# Patient Record
Sex: FEMALE | Race: WHITE | NOT HISPANIC OR LATINO | Employment: OTHER | URBAN - METROPOLITAN AREA
[De-identification: names, ages, dates, MRNs, and addresses within clinical notes are randomized per-mention and may not be internally consistent; named-entity substitution may affect disease eponyms.]

---

## 2017-01-01 PROBLEM — R62.7 ADULT FAILURE TO THRIVE SYNDROME: Status: ACTIVE | Noted: 2017-01-01

## 2017-01-01 PROBLEM — R63.4 WEIGHT LOSS: Status: ACTIVE | Noted: 2017-01-01

## 2017-01-01 PROBLEM — S42.309A HUMERUS FRACTURE: Status: ACTIVE | Noted: 2017-01-01

## 2017-01-03 ENCOUNTER — HOSPITAL ENCOUNTER (INPATIENT)
Facility: REHABILITATION | Age: 82
End: 2017-01-03
Attending: PHYSICAL MEDICINE & REHABILITATION | Admitting: PHYSICAL MEDICINE & REHABILITATION
Payer: MEDICARE

## 2017-01-04 PROCEDURE — 99309 SBSQ NF CARE MODERATE MDM 30: CPT | Performed by: PHYSICIAN ASSISTANT

## 2017-01-05 ENCOUNTER — RESOLUTE PROFESSIONAL BILLING HOSPITAL PROF FEE (OUTPATIENT)
Dept: HOSPITALIST | Facility: SKILLED NURSING FACILITY | Age: 82
End: 2017-01-05
Payer: MEDICARE

## 2017-01-05 PROCEDURE — 99306 1ST NF CARE HIGH MDM 50: CPT | Mod: AI | Performed by: FAMILY MEDICINE

## 2017-01-07 PROCEDURE — 99309 SBSQ NF CARE MODERATE MDM 30: CPT | Performed by: PHYSICIAN ASSISTANT

## 2017-01-12 PROCEDURE — 99308 SBSQ NF CARE LOW MDM 20: CPT | Performed by: NURSE PRACTITIONER

## 2017-01-16 PROCEDURE — 99308 SBSQ NF CARE LOW MDM 20: CPT | Performed by: NURSE PRACTITIONER

## 2017-01-20 PROCEDURE — 99309 SBSQ NF CARE MODERATE MDM 30: CPT | Performed by: PHYSICIAN ASSISTANT

## 2017-01-25 PROCEDURE — 99308 SBSQ NF CARE LOW MDM 20: CPT | Performed by: NURSE PRACTITIONER

## 2017-02-01 PROCEDURE — 99309 SBSQ NF CARE MODERATE MDM 30: CPT | Performed by: PHYSICIAN ASSISTANT

## 2017-02-03 PROCEDURE — 99316 NF DSCHRG MGMT 30 MIN+: CPT | Performed by: PHYSICIAN ASSISTANT

## 2017-07-22 ENCOUNTER — OFFICE VISIT (OUTPATIENT)
Dept: URGENT CARE | Facility: PHYSICIAN GROUP | Age: 82
End: 2017-07-22
Payer: MEDICARE

## 2017-07-22 VITALS
TEMPERATURE: 98.2 F | SYSTOLIC BLOOD PRESSURE: 126 MMHG | BODY MASS INDEX: 18.69 KG/M2 | DIASTOLIC BLOOD PRESSURE: 82 MMHG | OXYGEN SATURATION: 97 % | HEIGHT: 61 IN | RESPIRATION RATE: 12 BRPM | HEART RATE: 72 BPM | WEIGHT: 99 LBS

## 2017-07-22 DIAGNOSIS — S01.312A LACERATION OF EAR CANAL, LEFT, INITIAL ENCOUNTER: ICD-10-CM

## 2017-07-22 DIAGNOSIS — H61.23 BILATERAL IMPACTED CERUMEN: ICD-10-CM

## 2017-07-22 PROCEDURE — 99203 OFFICE O/P NEW LOW 30 MIN: CPT | Performed by: NURSE PRACTITIONER

## 2017-07-22 RX ORDER — NEOMYCIN SULFATE, POLYMYXIN B SULFATE, HYDROCORTISONE 3.5; 10000; 1 MG/ML; [USP'U]/ML; MG/ML
4 SOLUTION/ DROPS AURICULAR (OTIC) 3 TIMES DAILY
Qty: 1 BOTTLE | Refills: 0 | Status: SHIPPED | OUTPATIENT
Start: 2017-07-22 | End: 2021-08-12

## 2017-07-22 ASSESSMENT — ENCOUNTER SYMPTOMS
FEVER: 0
SORE THROAT: 0
COUGH: 0
VOMITING: 0
CHILLS: 0
HEADACHES: 0
NAUSEA: 0

## 2017-07-22 ASSESSMENT — PATIENT HEALTH QUESTIONNAIRE - PHQ9: CLINICAL INTERPRETATION OF PHQ2 SCORE: 0

## 2017-07-22 ASSESSMENT — PAIN SCALES - GENERAL: PAINLEVEL: NO PAIN

## 2017-07-22 NOTE — PROGRESS NOTES
"Subjective:      Sherri Balbuena is a 89 y.o. female who presents with Cerumen Impaction            HPI Comments: Medications, Allergies and Prior Medical Hx reviewed and updated in [facility].with patient/family today     Pt states she has decreased hearing in her left ear for > 1 year and in her right ear for a week.     Cerumen Impaction  This is a new problem. The current episode started more than 1 month ago. The problem occurs constantly. The problem has been gradually worsening. Pertinent negatives include no chills, congestion, coughing, fever, headaches, nausea, sore throat or vomiting. Nothing aggravates the symptoms. Treatments tried: ear gtts. The treatment provided no relief.       Review of Systems   Constitutional: Negative for fever and chills.   HENT: Positive for hearing loss. Negative for congestion, ear discharge, ear pain, sore throat and tinnitus.    Respiratory: Negative for cough.    Gastrointestinal: Negative for nausea and vomiting.   Neurological: Negative for headaches.          Objective:     /82 mmHg  Pulse 72  Temp(Src) 36.8 °C (98.2 °F)  Resp 12  Ht 1.549 m (5' 0.98\")  Wt 44.906 kg (99 lb)  BMI 18.72 kg/m2  SpO2 97%     Physical Exam   Constitutional: She appears well-developed and well-nourished. No distress.   HENT:   Head: Normocephalic and atraumatic.   Bilateral cerumen impaction, left canal has a small amt of blood, pt states she has put her finger nail in her left ear several times.    Eyes: Conjunctivae are normal. Pupils are equal, round, and reactive to light.   Neck: Neck supple.   Cardiovascular: Normal rate.    Pulmonary/Chest: Effort normal. No respiratory distress.   Neurological: She is alert.   Awake, alert, answering questions appropriately, moving all extremeties   Skin: Skin is warm and dry. No rash noted.   Psychiatric: She has a normal mood and affect. Her behavior is normal.               Assessment/Plan:     1. Bilateral impacted cerumen  EAR " IRRIGATION (MA ONLY)   2. Laceration of ear canal, left, initial encounter  NEOMYCIN-POLYMYXIN-HC, OTIC, (CORTISPORIN) 1 % Solution       Ears were soaked and then irrigated.   Both tm's are intact and canals are clear,   Left canal has a laceration that is oozing a small amt of bright red blood,     Follow-up with your primary care provider or return here for any problems of concerns  Discharge instructions discussed with pt/family who verbalize understanding and agreement with poc

## 2017-07-22 NOTE — MR AVS SNAPSHOT
"        Sherri Balbuena   2017 10:10 AM   Office Visit   MRN: [id number]    Department:  [facility]   Dept Phone:  [phone]    Description:  Female : 1928   Provider:  ANDRES Pineda           Reason for Visit     Cerumen Impaction x 1 day / bilat ears      Allergies as of 2017     No Known Allergies      You were diagnosed with     Bilateral impacted cerumen   [121912]       Laceration of ear canal, left, initial encounter   [367352]         Vital Signs     Blood Pressure Pulse Temperature Respirations Height Weight    126/82 mmHg 72 36.8 °C (98.2 °F) 12 1.549 m (5' 0.98\") 44.906 kg (99 lb)    Body Mass Index Oxygen Saturation Smoking Status             18.72 kg/m2 97% Never Smoker          Basic Information     Date Of Birth Sex Race Ethnicity Preferred Language    1928 Female White Non- English      Problem List              ICD-10-CM Priority Class Noted - Resolved    Hip fracture (CMS-HCC) S72.009A   2015 - Present    Anemia associated with acute blood loss D62   2015 - Present    Senile debility R54   2016 - Present    Humerus fracture S42.309A   2017 - Present    Weight loss R63.4   2017 - Present    Adult failure to thrive syndrome R62.7   2017 - Present      Health Maintenance        Date Due Completion Dates    PAP SMEAR 1949 ---    MAMMOGRAM 1968 ---    COLONOSCOPY 1978 ---    IMM ZOSTER VACCINE 1988 ---    BONE DENSITY 1993 ---    IMM PNEUMOCOCCAL 65+ (ADULT) LOW/MEDIUM RISK SERIES (2 of 2 - PCV13) 2016    IMM INFLUENZA (1) 2017 ---    IMM DTaP/Tdap/Td Vaccine (2 - Td) 2022            Current Immunizations     Pneumococcal polysaccharide vaccine (PPSV-23) 2015  6:25 PM    Tdap Vaccine 2012      Below and/or attached are the medications your provider expects you to take. Review all of your home medications and newly ordered medications with your provider and/or " pharmacist. Follow medication instructions as directed by your provider and/or pharmacist. Please keep your medication list with you and share with your provider. Update the information when medications are discontinued, doses are changed, or new medications (including over-the-counter products) are added; and carry medication information at all times in the event of emergency situations     Allergies:  No Known Allergies          Medications  Valid as of: July 22, 2017 - 11:32 AM    Generic Name Brand Name Tablet Size Instructions for use    Acetaminophen (Tab) TYLENOL 325 MG Take 2 Tabs by mouth every 6 hours as needed for Fever or Moderate Pain (Temp >101.5F).        Ascorbic Acid (Tab) VITAMIN C 500 MG Take 1 Tab by mouth every day.        Multiple Vitamin (Tab) THERAGRAN  Take 1 Tab by mouth every day.        Neomycin-Polymyxin-HC (Solution) NEOMYCIN-POLYMYXIN-HC (OTIC) 1 % Place 4 Drops in ear 3 times a day.        Oyster Shell (Tab) OS-FOREST 500 500 MG Take 2 Tabs by mouth 2 times a day, with meals.        .                 Medicines prescribed today were sent to:     [facility] #[id number] - [place], [place] - [address] [place] [address] AT [address]    [address] [place] [id number]    Phone: [phone] Fax: [phone]    Open 24 Hours?: No      Medication refill instructions:       If your prescription bottle indicates you have medication refills left, it is not necessary to call your provider’s office. Please contact your pharmacy and they will refill your medication.    If your prescription bottle indicates you do not have any refills left, you may request refills at any time through one of the following ways: The online [employer] system (except Urgent Care), by calling your provider’s office, or by asking your pharmacy to contact your provider’s office with a refill request. Medication refills are processed only during regular business hours and may not be available until the next business day. Your provider  may request additional information or to have a follow-up visit with you prior to refilling your medication.   *Please Note: Medication refills are assigned a new Rx number when refilled electronically. Your pharmacy may indicate that no refills were authorized even though a new prescription for the same medication is available at the pharmacy. Please request the medicine by name with the pharmacy before contacting your provider for a refill.        Your To Do List     Future Labs/Procedures Complete By Expires    EAR IRRIGATION (MA ONLY)  As directed 7/22/2018         [employer] Access Code: [email or web address]  Expires: 8/21/2017 11:32 AM    [employer]  A secure, online tool to manage your health information     [employer]® is a secure, online tool that connects you to your personalized health information from the privacy of your home -- day or night - making it very easy for you to manage your healthcare. Once the activation process is completed, you can even access your medical information using the [employer] whitney, which is available for free in the [employer] store or Google Play store.     [employer] provides the following levels of access (as shown below):   My Chart Features   [facility] Doctor [facility]  Urgent  Care Non-[facility]  Primary Care  Doctor   Email your healthcare team securely and privately 24/7 X X X    Manage appointments: schedule your next appointment; view details of past/upcoming appointments X      Request prescription refills. X      View recent personal medical records, including lab and immunizations X X X X   View health record, including health history, allergies, medications X X X X   Read reports about your outpatient visits, procedures, consult and ER notes X X X X   See your discharge summary, which is a recap of your hospital and/or ER visit that includes your diagnosis, lab results, and care plan. X X       How to register for [employer]:  1. Go to   [email or web address].  2. Click on the Sign Up Now box, which takes you to the New Member Sign Up page. You will need to provide the following information:  a. Enter your [employer] Access Code exactly as it appears at the top of this page. (You will not need to use this code after you’ve completed the sign-up process. If you do not sign up before the expiration date, you must request a new code.)   b. Enter your date of birth.   c. Enter your home email address.   d. Click Submit, and follow the next screen’s instructions.  3. Create a [employer] ID. This will be your [employer] login ID and cannot be changed, so think of one that is secure and easy to remember.  4. Create a [employer] password. You can change your password at any time.  5. Enter your Password Reset Question and Answer. This can be used at a later time if you forget your password.   6. Enter your e-mail address. This allows you to receive e-mail notifications when new information is available in [employer].  7. Click Sign Up. You can now view your health information.    For assistance activating your [employer] account, call [phone]

## 2020-12-14 ENCOUNTER — HOSPITAL ENCOUNTER (INPATIENT)
Facility: MEDICAL CENTER | Age: 85
LOS: 3 days | DRG: 378 | End: 2020-12-17
Attending: EMERGENCY MEDICINE | Admitting: HOSPITALIST
Payer: MEDICARE

## 2020-12-14 ENCOUNTER — APPOINTMENT (OUTPATIENT)
Dept: RADIOLOGY | Facility: MEDICAL CENTER | Age: 85
DRG: 378 | End: 2020-12-14
Attending: EMERGENCY MEDICINE
Payer: MEDICARE

## 2020-12-14 DIAGNOSIS — R19.7 DIARRHEA, UNSPECIFIED TYPE: ICD-10-CM

## 2020-12-14 DIAGNOSIS — K92.1 MELENA: ICD-10-CM

## 2020-12-14 DIAGNOSIS — D62 ACUTE BLOOD LOSS ANEMIA: ICD-10-CM

## 2020-12-14 DIAGNOSIS — K92.2 UPPER GI BLEED: ICD-10-CM

## 2020-12-14 PROBLEM — U07.1 COVID-19: Status: ACTIVE | Noted: 2020-12-14

## 2020-12-14 PROBLEM — R79.89 ELEVATED TROPONIN: Status: ACTIVE | Noted: 2020-12-14

## 2020-12-14 PROBLEM — R79.9 ELEVATED BUN: Status: ACTIVE | Noted: 2020-12-14

## 2020-12-14 LAB
ABO GROUP BLD: NORMAL
ALBUMIN SERPL BCP-MCNC: 3.8 G/DL (ref 3.2–4.9)
ALBUMIN/GLOB SERPL: 1.5 G/DL
ALP SERPL-CCNC: 51 U/L (ref 30–99)
ALT SERPL-CCNC: 7 U/L (ref 2–50)
ANION GAP SERPL CALC-SCNC: 11 MMOL/L (ref 7–16)
APTT PPP: 23.2 SEC (ref 24.7–36)
AST SERPL-CCNC: 16 U/L (ref 12–45)
BARCODED ABORH UBTYP: 9500
BARCODED PRD CODE UBPRD: NORMAL
BARCODED UNIT NUM UBUNT: NORMAL
BASOPHILS # BLD AUTO: 0.2 % (ref 0–1.8)
BASOPHILS # BLD: 0.02 K/UL (ref 0–0.12)
BILIRUB SERPL-MCNC: 0.2 MG/DL (ref 0.1–1.5)
BLD GP AB SCN SERPL QL: NORMAL
BUN SERPL-MCNC: 42 MG/DL (ref 8–22)
CALCIUM SERPL-MCNC: 9.3 MG/DL (ref 8.5–10.5)
CHLORIDE SERPL-SCNC: 106 MMOL/L (ref 96–112)
CO2 SERPL-SCNC: 21 MMOL/L (ref 20–33)
COMPONENT R 8504R: NORMAL
COVID ORDER STATUS COVID19: NORMAL
CREAT SERPL-MCNC: 1.05 MG/DL (ref 0.5–1.4)
EKG IMPRESSION: NORMAL
EOSINOPHIL # BLD AUTO: 0 K/UL (ref 0–0.51)
EOSINOPHIL NFR BLD: 0 % (ref 0–6.9)
ERYTHROCYTE [DISTWIDTH] IN BLOOD BY AUTOMATED COUNT: 49.2 FL (ref 35.9–50)
FLUAV RNA SPEC QL NAA+PROBE: NEGATIVE
FLUBV RNA SPEC QL NAA+PROBE: NEGATIVE
GLOBULIN SER CALC-MCNC: 2.6 G/DL (ref 1.9–3.5)
GLUCOSE SERPL-MCNC: 125 MG/DL (ref 65–99)
HCT VFR BLD AUTO: 17.5 % (ref 37–47)
HGB BLD-MCNC: 5.5 G/DL (ref 12–16)
IMM GRANULOCYTES # BLD AUTO: 0.06 K/UL (ref 0–0.11)
IMM GRANULOCYTES NFR BLD AUTO: 0.7 % (ref 0–0.9)
INR PPP: 0.99 (ref 0.87–1.13)
LYMPHOCYTES # BLD AUTO: 0.59 K/UL (ref 1–4.8)
LYMPHOCYTES NFR BLD: 6.7 % (ref 22–41)
MCH RBC QN AUTO: 29.7 PG (ref 27–33)
MCHC RBC AUTO-ENTMCNC: 31.1 G/DL (ref 33.6–35)
MCV RBC AUTO: 95.7 FL (ref 81.4–97.8)
MONOCYTES # BLD AUTO: 0.37 K/UL (ref 0–0.85)
MONOCYTES NFR BLD AUTO: 4.2 % (ref 0–13.4)
NEUTROPHILS # BLD AUTO: 7.75 K/UL (ref 2–7.15)
NEUTROPHILS NFR BLD: 88.2 % (ref 44–72)
NRBC # BLD AUTO: 0 K/UL
NRBC BLD-RTO: 0 /100 WBC
PLATELET # BLD AUTO: 219 K/UL (ref 164–446)
PMV BLD AUTO: 12.2 FL (ref 9–12.9)
POTASSIUM SERPL-SCNC: 5 MMOL/L (ref 3.6–5.5)
PRODUCT TYPE UPROD: NORMAL
PROT SERPL-MCNC: 6.4 G/DL (ref 6–8.2)
PROTHROMBIN TIME: 13.4 SEC (ref 12–14.6)
RBC # BLD AUTO: 1.85 M/UL (ref 4.2–5.4)
RH BLD: NORMAL
RSV RNA SPEC QL NAA+PROBE: NEGATIVE
SARS-COV-2 RNA RESP QL NAA+PROBE: NOTDETECTED
SODIUM SERPL-SCNC: 138 MMOL/L (ref 135–145)
SPECIMEN SOURCE: NORMAL
TROPONIN T SERPL-MCNC: 43 NG/L (ref 6–19)
UNIT STATUS USTAT: NORMAL
WBC # BLD AUTO: 8.8 K/UL (ref 4.8–10.8)

## 2020-12-14 PROCEDURE — 85018 HEMOGLOBIN: CPT

## 2020-12-14 PROCEDURE — 84484 ASSAY OF TROPONIN QUANT: CPT

## 2020-12-14 PROCEDURE — 30233N1 TRANSFUSION OF NONAUTOLOGOUS RED BLOOD CELLS INTO PERIPHERAL VEIN, PERCUTANEOUS APPROACH: ICD-10-PCS | Performed by: HOSPITALIST

## 2020-12-14 PROCEDURE — 700111 HCHG RX REV CODE 636 W/ 250 OVERRIDE (IP): Performed by: EMERGENCY MEDICINE

## 2020-12-14 PROCEDURE — 36430 TRANSFUSION BLD/BLD COMPNT: CPT

## 2020-12-14 PROCEDURE — 85730 THROMBOPLASTIN TIME PARTIAL: CPT

## 2020-12-14 PROCEDURE — 85610 PROTHROMBIN TIME: CPT

## 2020-12-14 PROCEDURE — 86900 BLOOD TYPING SEROLOGIC ABO: CPT

## 2020-12-14 PROCEDURE — 96375 TX/PRO/DX INJ NEW DRUG ADDON: CPT

## 2020-12-14 PROCEDURE — 0241U HCHG SARS-COV-2 COVID-19 NFCT DS RESP RNA 4 TRGT MIC: CPT

## 2020-12-14 PROCEDURE — 86923 COMPATIBILITY TEST ELECTRIC: CPT | Mod: 91

## 2020-12-14 PROCEDURE — 700111 HCHG RX REV CODE 636 W/ 250 OVERRIDE (IP): Performed by: HOSPITALIST

## 2020-12-14 PROCEDURE — 85025 COMPLETE CBC W/AUTO DIFF WBC: CPT

## 2020-12-14 PROCEDURE — 82272 OCCULT BLD FECES 1-3 TESTS: CPT | Performed by: EMERGENCY MEDICINE

## 2020-12-14 PROCEDURE — C9803 HOPD COVID-19 SPEC COLLECT: HCPCS | Performed by: EMERGENCY MEDICINE

## 2020-12-14 PROCEDURE — 96365 THER/PROPH/DIAG IV INF INIT: CPT

## 2020-12-14 PROCEDURE — 770006 HCHG ROOM/CARE - MED/SURG/GYN SEMI*

## 2020-12-14 PROCEDURE — C9113 INJ PANTOPRAZOLE SODIUM, VIA: HCPCS | Performed by: HOSPITALIST

## 2020-12-14 PROCEDURE — 71045 X-RAY EXAM CHEST 1 VIEW: CPT

## 2020-12-14 PROCEDURE — C9113 INJ PANTOPRAZOLE SODIUM, VIA: HCPCS | Performed by: EMERGENCY MEDICINE

## 2020-12-14 PROCEDURE — 99223 1ST HOSP IP/OBS HIGH 75: CPT | Mod: AI | Performed by: HOSPITALIST

## 2020-12-14 PROCEDURE — 93005 ELECTROCARDIOGRAM TRACING: CPT | Performed by: EMERGENCY MEDICINE

## 2020-12-14 PROCEDURE — 99285 EMERGENCY DEPT VISIT HI MDM: CPT

## 2020-12-14 PROCEDURE — 80053 COMPREHEN METABOLIC PANEL: CPT

## 2020-12-14 PROCEDURE — 700105 HCHG RX REV CODE 258: Performed by: HOSPITALIST

## 2020-12-14 PROCEDURE — 86901 BLOOD TYPING SEROLOGIC RH(D): CPT

## 2020-12-14 PROCEDURE — 86850 RBC ANTIBODY SCREEN: CPT

## 2020-12-14 PROCEDURE — P9016 RBC LEUKOCYTES REDUCED: HCPCS

## 2020-12-14 PROCEDURE — 36415 COLL VENOUS BLD VENIPUNCTURE: CPT

## 2020-12-14 RX ORDER — IBUPROFEN 200 MG
600 TABLET ORAL 2 TIMES DAILY PRN
COMMUNITY
End: 2021-08-12

## 2020-12-14 RX ORDER — ONDANSETRON 4 MG/1
4 TABLET, ORALLY DISINTEGRATING ORAL EVERY 4 HOURS PRN
Status: DISCONTINUED | OUTPATIENT
Start: 2020-12-14 | End: 2020-12-17 | Stop reason: HOSPADM

## 2020-12-14 RX ORDER — PANTOPRAZOLE SODIUM 40 MG/10ML
40 INJECTION, POWDER, LYOPHILIZED, FOR SOLUTION INTRAVENOUS ONCE
Status: COMPLETED | OUTPATIENT
Start: 2020-12-14 | End: 2020-12-14

## 2020-12-14 RX ORDER — BISACODYL 10 MG
10 SUPPOSITORY, RECTAL RECTAL
Status: DISCONTINUED | OUTPATIENT
Start: 2020-12-14 | End: 2020-12-14

## 2020-12-14 RX ORDER — AMOXICILLIN 250 MG
2 CAPSULE ORAL 2 TIMES DAILY
Status: DISCONTINUED | OUTPATIENT
Start: 2020-12-14 | End: 2020-12-14

## 2020-12-14 RX ORDER — POLYETHYLENE GLYCOL 3350 17 G/17G
1 POWDER, FOR SOLUTION ORAL
Status: DISCONTINUED | OUTPATIENT
Start: 2020-12-14 | End: 2020-12-14

## 2020-12-14 RX ORDER — ONDANSETRON 2 MG/ML
4 INJECTION INTRAMUSCULAR; INTRAVENOUS EVERY 4 HOURS PRN
Status: DISCONTINUED | OUTPATIENT
Start: 2020-12-14 | End: 2020-12-17 | Stop reason: HOSPADM

## 2020-12-14 RX ADMIN — PANTOPRAZOLE SODIUM 8 MG/HR: 40 INJECTION, POWDER, FOR SOLUTION INTRAVENOUS at 16:30

## 2020-12-14 RX ADMIN — PANTOPRAZOLE SODIUM 40 MG: 40 INJECTION, POWDER, LYOPHILIZED, FOR SOLUTION INTRAVENOUS at 14:59

## 2020-12-14 SDOH — HEALTH STABILITY: MENTAL HEALTH: HOW MANY STANDARD DRINKS CONTAINING ALCOHOL DO YOU HAVE ON A TYPICAL DAY?: 1 OR 2

## 2020-12-14 SDOH — HEALTH STABILITY: MENTAL HEALTH: HOW OFTEN DO YOU HAVE A DRINK CONTAINING ALCOHOL?: MONTHLY OR LESS

## 2020-12-14 ASSESSMENT — LIFESTYLE VARIABLES
HAVE YOU EVER FELT YOU SHOULD CUT DOWN ON YOUR DRINKING: NO
TOTAL SCORE: 0
SUBSTANCE_ABUSE: 0
EVER HAD A DRINK FIRST THING IN THE MORNING TO STEADY YOUR NERVES TO GET RID OF A HANGOVER: NO
HOW MANY TIMES IN THE PAST YEAR HAVE YOU HAD 5 OR MORE DRINKS IN A DAY: 0
CONSUMPTION TOTAL: NEGATIVE
TOTAL SCORE: 0
AVERAGE NUMBER OF DAYS PER WEEK YOU HAVE A DRINK CONTAINING ALCOHOL: 0
TOTAL SCORE: 0
EVER FELT BAD OR GUILTY ABOUT YOUR DRINKING: NO
ALCOHOL_USE: NO
DOES PATIENT WANT TO STOP DRINKING: CANNOT ASSESS
ON A TYPICAL DAY WHEN YOU DRINK ALCOHOL HOW MANY DRINKS DO YOU HAVE: 0
HAVE PEOPLE ANNOYED YOU BY CRITICIZING YOUR DRINKING: NO

## 2020-12-14 ASSESSMENT — ENCOUNTER SYMPTOMS
WEAKNESS: 1
SENSORY CHANGE: 0
DIARRHEA: 1
FOCAL WEAKNESS: 0
HALLUCINATIONS: 0
CHILLS: 0
BRUISES/BLEEDS EASILY: 0
DIZZINESS: 1
SPEECH CHANGE: 0
COUGH: 0
SHORTNESS OF BREATH: 1
ABDOMINAL PAIN: 0
FLANK PAIN: 0
HEARTBURN: 0
DEPRESSION: 0
MYALGIAS: 0
FEVER: 0
EYE DISCHARGE: 0
VOMITING: 0
BLURRED VISION: 0
BLOOD IN STOOL: 1
PALPITATIONS: 0
DOUBLE VISION: 0
HEMOPTYSIS: 0
NAUSEA: 0

## 2020-12-14 ASSESSMENT — PAIN DESCRIPTION - PAIN TYPE: TYPE: ACUTE PAIN

## 2020-12-14 NOTE — H&P
Hospital Medicine History & Physical Note    Date of Service  12/14/2020    Primary Care Physician  Juan Manuel Rasmussen M.D.    Consultants  GI     Code Status  DNAR/DNI    Chief Complaint  Chief Complaint   Patient presents with   • Diarrhea     x5 days   • Shortness of Breath       History of Presenting Illness  89 y.o. female who presented 12/14/2020 with past medical history of hypertension who presents to the hospital with black tarry stools.  This patient has had weakness dizziness shortness of breath and black tarry stools for the last 5 days.  She usually uses a walker to ambulate.  However due to her worsening hip pain she started taking Advil more frequently.  She has never had a history of a GI bleed in the past.  She denies any use of any blood thinners.  Denies any abdominal pain.  No nausea no vomiting. Otherwise no known alleviating or exacerbating factors to her symptoms.  She presented to the hospital hemodynamically stable however her hemoglobin is 5.5 and her hematocrit is 17.5 her BUN is 42 she will be admitted to the hospital with GI consultation for suspected upper GI bleed.     Review of Systems  Review of Systems   Constitutional: Negative for chills and fever.   HENT: Negative for congestion, hearing loss and tinnitus.    Eyes: Negative for blurred vision, double vision and discharge.   Respiratory: Positive for shortness of breath. Negative for cough and hemoptysis.    Cardiovascular: Negative for chest pain, palpitations and leg swelling.   Gastrointestinal: Positive for blood in stool, diarrhea and melena. Negative for abdominal pain, heartburn, nausea and vomiting.   Genitourinary: Negative for dysuria and flank pain.   Musculoskeletal: Positive for joint pain. Negative for myalgias.   Skin: Negative for rash.   Neurological: Positive for dizziness and weakness. Negative for sensory change, speech change and focal weakness.   Endo/Heme/Allergies: Negative for environmental allergies. Does  not bruise/bleed easily.   Psychiatric/Behavioral: Negative for depression, hallucinations and substance abuse.       Past Medical History   has a past medical history of Hypertension.    Surgical History   has a past surgical history that includes hip nailing intramedullary (Right, 5/23/2015).     Family History  Reviewed and not pertinent    Social History   reports that she has never smoked. She has never used smokeless tobacco. She reports current alcohol use of about 0.6 oz of alcohol per week. She reports that she does not use drugs.    Allergies  No Known Allergies    Medications  Prior to Admission Medications   Prescriptions Last Dose Informant Patient Reported? Taking?   NEOMYCIN-POLYMYXIN-HC, OTIC, (CORTISPORIN) 1 % Solution   No No   Sig: Place 4 Drops in ear 3 times a day.   acetaminophen (TYLENOL) 325 MG Tab   No No   Sig: Take 2 Tabs by mouth every 6 hours as needed for Fever or Moderate Pain (Temp >101.5F).   ascorbic acid (VITAMIN C) 500 MG tablet   No No   Sig: Take 1 Tab by mouth every day.   calcium carbonate (CALCIUM CARBONATE) 500 MG Tab   No No   Sig: Take 2 Tabs by mouth 2 times a day, with meals.   multivitamin (THERAGRAN) Tab   No No   Sig: Take 1 Tab by mouth every day.      Facility-Administered Medications: None       Physical Exam  Temp:  [36.1 °C (97 °F)] 36.1 °C (97 °F)  Pulse:  [76-84] 77  Resp:  [15-20] 15  BP: (114-134)/(49-63) 134/58  SpO2:  [90 %-94 %] 90 %    Physical Exam  Vitals signs reviewed.   Constitutional:       Appearance: Normal appearance. She is ill-appearing.   HENT:      Head: Normocephalic and atraumatic.      Nose: No congestion.      Mouth/Throat:      Mouth: Mucous membranes are dry.   Eyes:      Extraocular Movements: Extraocular movements intact.      Pupils: Pupils are equal, round, and reactive to light.   Neck:      Musculoskeletal: Normal range of motion and neck supple. No muscular tenderness.   Cardiovascular:      Rate and Rhythm: Normal rate and  regular rhythm.      Pulses: Normal pulses.      Heart sounds: Normal heart sounds. No murmur.   Pulmonary:      Effort: Pulmonary effort is normal. No respiratory distress.      Breath sounds: Normal breath sounds. No stridor.   Abdominal:      General: Bowel sounds are normal. There is no distension.      Palpations: Abdomen is soft.      Tenderness: There is no abdominal tenderness.   Musculoskeletal:         General: No swelling or deformity.   Skin:     General: Skin is warm and dry.      Capillary Refill: Capillary refill takes 2 to 3 seconds.      Coloration: Skin is pale.   Neurological:      General: No focal deficit present.      Mental Status: She is alert and oriented to person, place, and time.   Psychiatric:         Mood and Affect: Mood normal.         Behavior: Behavior normal.         Thought Content: Thought content normal.         Judgment: Judgment normal.         Laboratory:  Recent Labs     12/14/20  1250   WBC 8.8   RBC 1.85*   HEMOGLOBIN 5.5*   HEMATOCRIT 17.5*   MCV 95.7   MCH 29.7   MCHC 31.1*   RDW 49.2   PLATELETCT 219   MPV 12.2     Recent Labs     12/14/20  1250   SODIUM 138   POTASSIUM 5.0   CHLORIDE 106   CO2 21   GLUCOSE 125*   BUN 42*   CREATININE 1.05   CALCIUM 9.3     Recent Labs     12/14/20  1250   ALTSGPT 7   ASTSGOT 16   ALKPHOSPHAT 51   TBILIRUBIN 0.2   GLUCOSE 125*     Recent Labs     12/14/20  1250   APTT 23.2*   INR 0.99     No results for input(s): NTPROBNP in the last 72 hours.      Recent Labs     12/14/20  1250   TROPONINT 43*       Imaging:  DX-CHEST-PORTABLE (1 VIEW)   Final Result      Patchy ill-defined bilateral pulmonary parenchymal opacities. These findings can be seen in the clinical setting of Covid pneumonia.            Assessment/Plan:  I anticipate this patient will require at least two midnights for appropriate medical management, necessitating inpatient admission.    * Upper GI bleed  Assessment & Plan  Suspect upper GI bleed due to excessive nsaid use    Cont with serial hgb   Transfuse 2 unit prbc now   IV PPI continous for now   CLD   GI consulted by ERP    COVID-19  Assessment & Plan  Xray with covid 19   Patient asymptomatic   Check covid swab prior to admission    Elevated troponin  Assessment & Plan  Demand from anemia   No chest pain     Acute blood loss anemia  Assessment & Plan  Due to GI bleed   Cont to trend hgb   Transfuse 2 units prbc now       Elevated BUN  Assessment & Plan  Due to gi bleed  Cont to monitor    Ppx: SCD

## 2020-12-14 NOTE — ED NOTES
Annabelle, employee ID [id number] from Lab called with critical result of Hgb 5.5 and Hct 17.5 at 1352. Critical lab result read back to Annabelle.   Dr. Smith notified of critical lab result at 1400.  Critical lab result read back by Dr. Smith.

## 2020-12-14 NOTE — ED NOTES
2 RN verification for RBC transfusion, consent signed.     Vitals:    12/14/20 1522   BP: 136/67   Pulse: 82   Resp: 17   Temp: 36.5 °C (97.7 °F)   SpO2: 96%

## 2020-12-14 NOTE — ASSESSMENT & PLAN NOTE
Suspect upper GI bleed due to excessive nsaid use   Hold serial hgb, recheck in AM.   Transfused 2 unit prbc  IV PPI continous to PO BID on HD#2.   GI consulted by ERP, EGD HD#1 per above, [place] HD#2 per above.

## 2020-12-14 NOTE — ED NOTES
At 15 minute florinda of blood transfusion, no transfusion reaction noted.     Vitals:    12/14/20 1537   BP: 147/60   Pulse: 79   Resp: 18   Temp: 36.4 °C (97.5 °F)   SpO2: 99%

## 2020-12-14 NOTE — ED PROVIDER NOTES
ED Provider  Scribed for Nasir Smith D.O. by Luis M Blevins. 12/14/2020  1:10 PM    Means of arrival:Ambulance  History obtained from:Patient  History limited by: None    CHIEF COMPLAINT  Chief Complaint   Patient presents with   • Diarrhea     x5 days   • Shortness of Breath     HPI  Sherri Balbuena is a 89 y.o. female who presents for evaluation by ambulance of light headedness onset prior to arrival. She reports today, she took two steps and had a near syncopal event. She says she feels improved now. She denies loss of consciousness or falling today. She notes she uses a walker to ambulate secondary to history of recent hip surgery. She admits to associated symptoms of dark/black colored stools (5 days), diarrhea (5 days), shortness of breath, nausea, and mildly pale skin, but denies abdominal pain, or vomiting. She denies alleviating factors. She says she takes Advil periodically, but denies medicating with any blood thinners. She lives at home alone and says she can adequately care for herself. She denies history of GI bleed.     PPE Note: I personally donned PPE for all patient encounters during this visit, including being clean-shaven with a surgical mask, gloves, and eye protection.     Scribe remained outside the patient's room and did not have any contact with the patient for the duration of patient encounter.       REVIEW OF SYSTEMS  See HPI for further details. All other systems are negative.     PAST MEDICAL HISTORY   has a past medical history of Hypertension.    SOCIAL HISTORY  Social History     Tobacco Use   • Smoking status: Never Smoker   • Smokeless tobacco: Never Used   Substance and Sexual Activity   • Alcohol use: Yes     Alcohol/week: 0.6 oz     Types: 1 Standard drinks or equivalent per week     Frequency: Monthly or less     Drinks per session: 1 or 2     Comment: rarely, martini   • Drug use: No   • Sexual activity: None noted       SURGICAL HISTORY   has a past surgical history  "that includes hip nailing intramedullary (Right, 5/23/2015).    CURRENT MEDICATIONS  Home Medications     Reviewed by Swetha Lester R.N. (Registered Nurse) on 12/14/20 at 1304  Med List Status: Not Addressed   Medication Last Dose Status   acetaminophen (TYLENOL) 325 MG Tab  Active   ascorbic acid (VITAMIN C) 500 MG tablet  Active   calcium carbonate (CALCIUM CARBONATE) 500 MG Tab  Active   multivitamin (THERAGRAN) Tab  Active   NEOMYCIN-POLYMYXIN-HC, OTIC, (CORTISPORIN) 1 % Solution  Active              ALLERGIES  No Known Allergies    PHYSICAL EXAM  VITAL SIGNS: /55   Pulse 76   Temp 36.1 °C (97 °F) (Temporal)   Resp 18   Ht 1.549 m (5' 1\")   Wt 47.6 kg (105 lb)   SpO2 94%   BMI 19.84 kg/m²   Constitutional: Alert in no apparent distress.  HENT: No signs of trauma, mucous membranes are moist  Eyes: Conjunctiva normal, Non-icteric.   Neck: Normal range of motion, No tenderness, Supple.  Lymphatic: No lymphadenopathy noted.   Cardiovascular: Regular rate and rhythm, no murmurs.   Thorax & Lungs: Normal breath sounds, No respiratory distress, No wheezing, No chest tenderness.   Abdomen: Bowel sounds normal, Soft, No tenderness, No masses, No pulsatile masses. No peritoneal signs.  Skin: Warm, Dry, normal color.   Back: No bony tenderness, No CVA tenderness.   Extremities: No edema, No tenderness, No cyanosis  Musculoskeletal: Good range of motion in all major joints. No tenderness to palpation or major deformities noted.   Rectal: Rectal exam performed by me with female ED technician supervision. Black stool hemoccult positive.   Neurologic: Alert and oriented x4, Normal motor function, Normal sensory function, No focal deficits noted.   Psychiatric: Affect normal, Judgment normal, Mood normal.     DIAGNOSTIC STUDIES / PROCEDURES    EKG  12 Lead EKG interpreted by me shown below.     LABS  Results for orders placed or performed during the hospital encounter of 12/14/20   CBC WITH DIFFERENTIAL   Result " Value Ref Range    WBC 8.8 4.8 - 10.8 K/uL    RBC 1.85 (L) 4.20 - 5.40 M/uL    Hemoglobin 5.5 (LL) 12.0 - 16.0 g/dL    Hematocrit 17.5 (LL) 37.0 - 47.0 %    MCV 95.7 81.4 - 97.8 fL    MCH 29.7 27.0 - 33.0 pg    MCHC 31.1 (L) 33.6 - 35.0 g/dL    RDW 49.2 35.9 - 50.0 fL    Platelet Count 219 164 - 446 K/uL    MPV 12.2 9.0 - 12.9 fL    Neutrophils-Polys 88.20 (H) 44.00 - 72.00 %    Lymphocytes 6.70 (L) 22.00 - 41.00 %    Monocytes 4.20 0.00 - 13.40 %    Eosinophils 0.00 0.00 - 6.90 %    Basophils 0.20 0.00 - 1.80 %    Immature Granulocytes 0.70 0.00 - 0.90 %    Nucleated RBC 0.00 /100 WBC    Neutrophils (Absolute) 7.75 (H) 2.00 - 7.15 K/uL    Lymphs (Absolute) 0.59 (L) 1.00 - 4.80 K/uL    Monos (Absolute) 0.37 0.00 - 0.85 K/uL    Eos (Absolute) 0.00 0.00 - 0.51 K/uL    Baso (Absolute) 0.02 0.00 - 0.12 K/uL    Immature Granulocytes (abs) 0.06 0.00 - 0.11 K/uL    NRBC (Absolute) 0.00 K/uL   COMP METABOLIC PANEL   Result Value Ref Range    Sodium 138 135 - 145 mmol/L    Potassium 5.0 3.6 - 5.5 mmol/L    Chloride 106 96 - 112 mmol/L    Co2 21 20 - 33 mmol/L    Anion Gap 11.0 7.0 - 16.0    Glucose 125 (H) 65 - 99 mg/dL    Bun 42 (H) 8 - 22 mg/dL    Creatinine 1.05 0.50 - 1.40 mg/dL    Calcium 9.3 8.5 - 10.5 mg/dL    AST(SGOT) 16 12 - 45 U/L    ALT(SGPT) 7 2 - 50 U/L    Alkaline Phosphatase 51 30 - 99 U/L    Total Bilirubin 0.2 0.1 - 1.5 mg/dL    Albumin 3.8 3.2 - 4.9 g/dL    Total Protein 6.4 6.0 - 8.2 g/dL    Globulin 2.6 1.9 - 3.5 g/dL    A-G Ratio 1.5 g/dL   TROPONIN   Result Value Ref Range    Troponin T 43 (H) 6 - 19 ng/L   APTT   Result Value Ref Range    APTT 23.2 (L) 24.7 - 36.0 sec   PROTHROMBIN TIME (INR)   Result Value Ref Range    PT 13.4 12.0 - 14.6 sec    INR 0.99 0.87 - 1.13   COD (ADULT)   Result Value Ref Range    ABO Grouping Only O     Rh Grouping Only NEG     Antibody Screen-Cod NEG     Component R       R33                 Red Blood Cells     [id number]   issued       12/14/20   15:12      Product  Type Red Blood Cells LR Pheresis     Dispense Status issued     Unit Number (Barcoded) [id number]     Product Code (Barcoded) E6150P53     Blood Type (Barcoded) 9500     Component R       R99                 Red Cells, LR       [id number]   issued       20   17:14      Product Type R99     Dispense Status issued     Unit Number (Barcoded) [id number]     Product Code (Barcoded) O5133I03     Blood Type (Barcoded) 9500    ESTIMATED GFR   Result Value Ref Range    GFR If  59 (A) >60 mL/min/1.73 m 2    GFR If Non African American 49 (A) >60 mL/min/1.73 m 2   COVID/SARS CoV-2 PCR    Specimen: Nasopharyngeal; Respirate   Result Value Ref Range    COVID Order Status Received    CoV-2, Flu A/B, And RSV by PCR   Result Value Ref Range    Influenza virus A RNA Negative Negative    Influenza virus B, PCR Negative Negative    RSV, PCR Negative Negative    SARS-CoV-2 by PCR NotDetected     SARS-CoV-2 Source NP Swab    EKG (NOW)   Result Value Ref Range    Report       [facility] Emergency Dept.    Test Date:  2020  Pt Name:    RJ TEJADA           Department: ER  MRN:        [id number]                      Room:        16  Gender:     Female                       Technician: [id number]  :        [id number]                   Requested By:MAGGY CASTILLO  Order #:    [id number]                    Reading MD: MAGGY CASTILLO, DNoheliaONohelia    Measurements  Intervals                                Axis  Rate:       79                           P:          46  IN:         164                          QRS:        21  QRSD:       122                          T:          124  QT:         367  QTc:        421    Interpretive Statements  Sinus rhythm  Nonspecific intraventricular conduction delay  Borderline repolarization abnormality  Baseline wander in lead(s) V4  Compared to ECG 2015 19:12:04  Intraventricular conduction delay now present  T-wave abnormality no longer  present  Electronically Signed On 12- 14:51:21 PST  by MAGGY CASTILLO D.O.        All labs reviewed by me.    RADIOLOGY  DX-CHEST-PORTABLE (1 VIEW)   Final Result      Patchy ill-defined bilateral pulmonary parenchymal opacities. These findings can be seen in the clinical setting of Covid pneumonia.        The radiologist's interpretations of all radiological studies have been reviewed by me.    Films have been independently by me      COURSE  Pertinent Labs & Imaging studies reviewed. (See chart for details)    Review of past medical records shows the patient has no visits since 2016.     1:10 PM - Patient seen and examined at bedside. Discussed plan of care. The patient will be medicated with pantoprazole 40 mg injection. Ordered for DX-Chest, EKG, CBC with diff, CMP, Troponin, POC Occult Blood Stool, APTT, INR, COD to evaluate her symptoms.     2:00 PM - Lab called with a Critical value of hemoglobin of 5.5. Patient was reevaluated at bedside. Discussed lab results with the patient as noted above.  I have explained to the patient the risks and benefits of transfusion of blood products.  This includes, as appropriate, the risk of mild allergic reaction, hemolytic reaction, transfusion-associated lung injury, febrile reactions, circulatory or iron overload, and infection.    We discussed possible alternatives and their risks, including directed donation, autologous transfusion, and no transfusion, including IV or oral iron supplementation, as appropriate.  I believe the patient understands the risks and benefits and was able to express understanding. Packed red blood cells were ordered for transfusion.     2:42 PM - Paged Hospitalist.     2:47 PM - Paged GI.     2:49 PM - Ordered COVID/SARS CoV-2 PCR.     3:07 PM - I discussed the patient's case and the above findings with Dr. Diop (Hospitalist) who will assess the patient for hospitalization.      3:14 PM - I discussed the patient's case and the above  findings with Dr. Harris (GI) who will consult on the patient.      CRITICAL CARE  The very real possibilty of a deterioration of this patient's condition required the highest level of my preparedness for sudden, emergent intervention.  I provided critical care services, which included medication orders, frequent reevaluations of the patient's condition and response to treatment, ordering and reviewing test results, and discussing the case with various consultants.  The critical care time associated with the care of the patient was 30 minutes. Review chart for interventions. This time is exclusive of any other billable procedures.      MEDICAL DECISION MAKING  This is a 89 y.o. female who presents presents with black diarrheal stools.  She attempted brought stool sample which she was unable to do so rectal exam was done it is grossly positive for blood.  She does look pallid, her hemoglobin is 5.5.  IV Protonix was initiated.  And the transfusion was initiated.  Patient is not tachycardic, and blood pressure is normal.    The patient does have acute severe blood loss anemia probably from upper GI bleed.  I spoke with hospitalist for admission gastroenterology for consultation.    DISPOSITION:  Patient will be hospitalized by Dr. Diop in critical condition.     FINAL IMPRESSION  1. Diarrhea, unspecified type    2. Melena    3. Upper GI bleed    4. Acute blood loss anemia    5.     The critical care time associated with the care of the patient was 30 minutes.     Luis M POWELL (Ricardo), am scribing for, and in the presence of, Nasir Smith D.O..    Electronically signed by: Luis M Blevins (Ricardo), 12/14/2020    Nasir POWELL D.O. personally performed the services described in this documentation, as scribed by Luis M Blevins in my presence, and it is both accurate and complete.    The note accurately reflects work and decisions made by me.  Nasir Smith D.O.  12/14/2020  6:21 PM

## 2020-12-14 NOTE — ED TRIAGE NOTES
Sherri Balbuena  89 y.o. female  Chief Complaint   Patient presents with   • Diarrhea     x5 days   • Shortness of Breath     Patient brought in by KYLAHSA from home. Patient reports she has been having black & red colored diarrhea since Thursday. Patient denies abdominal pain. Patient also reports SOB on exertion recently this week.

## 2020-12-15 ENCOUNTER — ANESTHESIA (OUTPATIENT)
Dept: SURGERY | Facility: MEDICAL CENTER | Age: 85
DRG: 378 | End: 2020-12-15
Payer: MEDICARE

## 2020-12-15 ENCOUNTER — ANESTHESIA EVENT (OUTPATIENT)
Dept: SURGERY | Facility: MEDICAL CENTER | Age: 85
DRG: 378 | End: 2020-12-15
Payer: MEDICARE

## 2020-12-15 LAB
ALBUMIN SERPL BCP-MCNC: 3.4 G/DL (ref 3.2–4.9)
ALBUMIN/GLOB SERPL: 1.5 G/DL
ALP SERPL-CCNC: 49 U/L (ref 30–99)
ALT SERPL-CCNC: 9 U/L (ref 2–50)
ANION GAP SERPL CALC-SCNC: 6 MMOL/L (ref 7–16)
AST SERPL-CCNC: 17 U/L (ref 12–45)
BASOPHILS # BLD AUTO: 0.4 % (ref 0–1.8)
BASOPHILS # BLD: 0.03 K/UL (ref 0–0.12)
BILIRUB SERPL-MCNC: 1.3 MG/DL (ref 0.1–1.5)
BUN SERPL-MCNC: 37 MG/DL (ref 8–22)
CALCIUM SERPL-MCNC: 8.9 MG/DL (ref 8.5–10.5)
CHLORIDE SERPL-SCNC: 113 MMOL/L (ref 96–112)
CO2 SERPL-SCNC: 22 MMOL/L (ref 20–33)
CREAT SERPL-MCNC: 0.83 MG/DL (ref 0.5–1.4)
EOSINOPHIL # BLD AUTO: 0.05 K/UL (ref 0–0.51)
EOSINOPHIL NFR BLD: 0.6 % (ref 0–6.9)
ERYTHROCYTE [DISTWIDTH] IN BLOOD BY AUTOMATED COUNT: 49.7 FL (ref 35.9–50)
GLOBULIN SER CALC-MCNC: 2.3 G/DL (ref 1.9–3.5)
GLUCOSE SERPL-MCNC: 99 MG/DL (ref 65–99)
HCT VFR BLD AUTO: 26.8 % (ref 37–47)
HGB BLD-MCNC: 7.3 G/DL (ref 12–16)
HGB BLD-MCNC: 7.8 G/DL (ref 12–16)
HGB BLD-MCNC: 8.9 G/DL (ref 12–16)
HGB BLD-MCNC: 9 G/DL (ref 12–16)
IMM GRANULOCYTES # BLD AUTO: 0.14 K/UL (ref 0–0.11)
IMM GRANULOCYTES NFR BLD AUTO: 1.6 % (ref 0–0.9)
LYMPHOCYTES # BLD AUTO: 1.11 K/UL (ref 1–4.8)
LYMPHOCYTES NFR BLD: 13 % (ref 22–41)
MCH RBC QN AUTO: 30.4 PG (ref 27–33)
MCHC RBC AUTO-ENTMCNC: 33.6 G/DL (ref 33.6–35)
MCV RBC AUTO: 90.5 FL (ref 81.4–97.8)
MONOCYTES # BLD AUTO: 0.74 K/UL (ref 0–0.85)
MONOCYTES NFR BLD AUTO: 8.6 % (ref 0–13.4)
NEUTROPHILS # BLD AUTO: 6.49 K/UL (ref 2–7.15)
NEUTROPHILS NFR BLD: 75.8 % (ref 44–72)
NRBC # BLD AUTO: 0.07 K/UL
NRBC BLD-RTO: 0.8 /100 WBC
PLATELET # BLD AUTO: 179 K/UL (ref 164–446)
PMV BLD AUTO: 11.7 FL (ref 9–12.9)
POTASSIUM SERPL-SCNC: 4.1 MMOL/L (ref 3.6–5.5)
PROT SERPL-MCNC: 5.7 G/DL (ref 6–8.2)
RBC # BLD AUTO: 2.96 M/UL (ref 4.2–5.4)
SODIUM SERPL-SCNC: 141 MMOL/L (ref 135–145)
WBC # BLD AUTO: 8.6 K/UL (ref 4.8–10.8)

## 2020-12-15 PROCEDURE — 99233 SBSQ HOSP IP/OBS HIGH 50: CPT | Performed by: HOSPITALIST

## 2020-12-15 PROCEDURE — 700101 HCHG RX REV CODE 250: Performed by: INTERNAL MEDICINE

## 2020-12-15 PROCEDURE — 36415 COLL VENOUS BLD VENIPUNCTURE: CPT

## 2020-12-15 PROCEDURE — 160202 HCHG ENDO MINUTES - 1ST 30 MINS LEVEL 3: Performed by: INTERNAL MEDICINE

## 2020-12-15 PROCEDURE — 700105 HCHG RX REV CODE 258: Performed by: HOSPITALIST

## 2020-12-15 PROCEDURE — 80053 COMPREHEN METABOLIC PANEL: CPT

## 2020-12-15 PROCEDURE — 0D748ZZ DILATION OF ESOPHAGOGASTRIC JUNCTION, VIA NATURAL OR ARTIFICIAL OPENING ENDOSCOPIC: ICD-10-PCS | Performed by: INTERNAL MEDICINE

## 2020-12-15 PROCEDURE — 160009 HCHG ANES TIME/MIN: Performed by: INTERNAL MEDICINE

## 2020-12-15 PROCEDURE — 700105 HCHG RX REV CODE 258: Performed by: ANESTHESIOLOGY

## 2020-12-15 PROCEDURE — 700111 HCHG RX REV CODE 636 W/ 250 OVERRIDE (IP): Performed by: HOSPITALIST

## 2020-12-15 PROCEDURE — 160048 HCHG OR STATISTICAL LEVEL 1-5: Performed by: INTERNAL MEDICINE

## 2020-12-15 PROCEDURE — 0DJ68ZZ INSPECTION OF STOMACH, VIA NATURAL OR ARTIFICIAL OPENING ENDOSCOPIC: ICD-10-PCS | Performed by: INTERNAL MEDICINE

## 2020-12-15 PROCEDURE — 85018 HEMOGLOBIN: CPT

## 2020-12-15 PROCEDURE — C9113 INJ PANTOPRAZOLE SODIUM, VIA: HCPCS | Performed by: HOSPITALIST

## 2020-12-15 PROCEDURE — 160035 HCHG PACU - 1ST 60 MINS PHASE I: Performed by: INTERNAL MEDICINE

## 2020-12-15 PROCEDURE — 160002 HCHG RECOVERY MINUTES (STAT): Performed by: INTERNAL MEDICINE

## 2020-12-15 PROCEDURE — 85025 COMPLETE CBC W/AUTO DIFF WBC: CPT

## 2020-12-15 PROCEDURE — 700111 HCHG RX REV CODE 636 W/ 250 OVERRIDE (IP): Performed by: ANESTHESIOLOGY

## 2020-12-15 PROCEDURE — 770006 HCHG ROOM/CARE - MED/SURG/GYN SEMI*

## 2020-12-15 PROCEDURE — 700101 HCHG RX REV CODE 250: Performed by: ANESTHESIOLOGY

## 2020-12-15 RX ORDER — OXYCODONE HCL 5 MG/5 ML
10 SOLUTION, ORAL ORAL
Status: DISCONTINUED | OUTPATIENT
Start: 2020-12-15 | End: 2020-12-15 | Stop reason: HOSPADM

## 2020-12-15 RX ORDER — LIDOCAINE HYDROCHLORIDE 20 MG/ML
INJECTION, SOLUTION EPIDURAL; INFILTRATION; INTRACAUDAL; PERINEURAL PRN
Status: DISCONTINUED | OUTPATIENT
Start: 2020-12-15 | End: 2020-12-15 | Stop reason: SURG

## 2020-12-15 RX ORDER — SODIUM CHLORIDE, SODIUM LACTATE, POTASSIUM CHLORIDE, CALCIUM CHLORIDE 600; 310; 30; 20 MG/100ML; MG/100ML; MG/100ML; MG/100ML
INJECTION, SOLUTION INTRAVENOUS CONTINUOUS
Status: DISCONTINUED | OUTPATIENT
Start: 2020-12-15 | End: 2020-12-15 | Stop reason: HOSPADM

## 2020-12-15 RX ORDER — OXYCODONE HCL 5 MG/5 ML
5 SOLUTION, ORAL ORAL
Status: DISCONTINUED | OUTPATIENT
Start: 2020-12-15 | End: 2020-12-15 | Stop reason: HOSPADM

## 2020-12-15 RX ORDER — SODIUM CHLORIDE, SODIUM LACTATE, POTASSIUM CHLORIDE, CALCIUM CHLORIDE 600; 310; 30; 20 MG/100ML; MG/100ML; MG/100ML; MG/100ML
INJECTION, SOLUTION INTRAVENOUS
Status: DISCONTINUED | OUTPATIENT
Start: 2020-12-15 | End: 2020-12-15 | Stop reason: HOSPADM

## 2020-12-15 RX ORDER — ONDANSETRON 2 MG/ML
4 INJECTION INTRAMUSCULAR; INTRAVENOUS
Status: DISCONTINUED | OUTPATIENT
Start: 2020-12-15 | End: 2020-12-15 | Stop reason: HOSPADM

## 2020-12-15 RX ADMIN — LIDOCAINE HYDROCHLORIDE 3 ML: 20 INJECTION, SOLUTION EPIDURAL; INFILTRATION; INTRACAUDAL at 11:32

## 2020-12-15 RX ADMIN — SODIUM CHLORIDE, POTASSIUM CHLORIDE, SODIUM LACTATE AND CALCIUM CHLORIDE: 600; 310; 30; 20 INJECTION, SOLUTION INTRAVENOUS at 11:29

## 2020-12-15 RX ADMIN — PANTOPRAZOLE SODIUM 8 MG/HR: 40 INJECTION, POWDER, FOR SOLUTION INTRAVENOUS at 02:44

## 2020-12-15 RX ADMIN — PANTOPRAZOLE SODIUM 8 MG/HR: 40 INJECTION, POWDER, FOR SOLUTION INTRAVENOUS at 18:19

## 2020-12-15 RX ADMIN — SODIUM CHLORIDE, POTASSIUM CHLORIDE, SODIUM LACTATE AND CALCIUM CHLORIDE: 600; 310; 30; 20 INJECTION, SOLUTION INTRAVENOUS at 14:01

## 2020-12-15 RX ADMIN — PROPOFOL 50 MG: 10 INJECTION, EMULSION INTRAVENOUS at 11:32

## 2020-12-15 RX ADMIN — POLYETHYLENE GLYCOL 3350, SODIUM SULFATE ANHYDROUS, SODIUM BICARBONATE, SODIUM CHLORIDE, POTASSIUM CHLORIDE 2 L: 236; 22.74; 6.74; 5.86; 2.97 POWDER, FOR SOLUTION ORAL at 15:41

## 2020-12-15 RX ADMIN — PROPOFOL 25 MG: 10 INJECTION, EMULSION INTRAVENOUS at 11:39

## 2020-12-15 ASSESSMENT — COGNITIVE AND FUNCTIONAL STATUS - GENERAL
TOILETING: A LITTLE
STANDING UP FROM CHAIR USING ARMS: A LITTLE
SUGGESTED CMS G CODE MODIFIER MOBILITY: CJ
TURNING FROM BACK TO SIDE WHILE IN FLAT BAD: A LITTLE
DAILY ACTIVITIY SCORE: 23
MOBILITY SCORE: 21
SUGGESTED CMS G CODE MODIFIER DAILY ACTIVITY: CI
WALKING IN HOSPITAL ROOM: A LITTLE

## 2020-12-15 ASSESSMENT — PATIENT HEALTH QUESTIONNAIRE - PHQ9
1. LITTLE INTEREST OR PLEASURE IN DOING THINGS: NOT AT ALL
SUM OF ALL RESPONSES TO PHQ9 QUESTIONS 1 AND 2: 0
2. FEELING DOWN, DEPRESSED, IRRITABLE, OR HOPELESS: NOT AT ALL

## 2020-12-15 ASSESSMENT — PAIN SCALES - GENERAL: PAIN_LEVEL: 0

## 2020-12-15 ASSESSMENT — PAIN DESCRIPTION - PAIN TYPE: TYPE: ACUTE PAIN

## 2020-12-15 NOTE — OR NURSING
1236- report received from Annabelle HER RN, pt drinking ginger ale.    1301- pt discharge from unit by transport on room air, S

## 2020-12-15 NOTE — ANESTHESIA QCDR
2019 [facility] Clinical Data Registry (for Quality Improvement)     Postoperative nausea/vomiting risk protocol (Adult = 18 yrs and Pediatric 3-17 yrs)- (430 and 463)  General inhalation anesthetic (NOT TIVA) with PONV risk factors: No  Provision of anti-emetic therapy with at least 2 different classes of agents: N/A  Patient DID NOT receive anti-emetic therapy and reason is documented in Medical Record: N/A    Multimodal Pain Management- (477)  Non-emergent surgery AND patient age >= 18: No  Use of Multimodal Pain Management, two or more drugs and/or interventions, NOT including systemic opioids:   Exception: Documented allergy to multiple classes of analgesics:     Smoking Abstinence (404)  Patient is current smoker (cigarette, pipe, e-cig, marijuanna): No  Elective Surgery:   Abstinence instructions provided prior to day of surgery:   Patient abstained from smoking on day of surgery:     Pre-Op Beta-Blocker in Isolated CABG (44)  Isolated CABG AND patient age >= 18: No  Beta-blocker admin within 24 hours of surgical incision:   Exception:of medical reason(s) for not administering beta blocker within 24 hours prior to surgical incision (e.g., not  indicated,other medical reason):     PACU assessment of acute postoperative pain prior to Anesthesia Care End- Applies to Patients Age = 18- (ABG7)  Initial PACU pain score is which of the following: < 7/10  Patient unable to report pain score: N/A    Post-anesthetic transfer of care checklist/protocol to PACU/ICU- (426 and 427)  Upon conclusion of case, patient transferred to which of the following locations: PACU/Non-ICU  Use of transfer checklist/protocol: Yes  Exclusion: Service Performed in Patient Hospital Room (and thus did not require transfer): N/A  Unplanned admission to ICU related to anesthesia service up through end of PACU care- (MD51)  Unplanned admission to ICU (not initially anticipated at anesthesia start time): No

## 2020-12-15 NOTE — OR NURSING
1145:Pt. Received from OR, report from Dr. De León and RN. Respirations even unlabored. Oxygen in place. No signs of nausea or vomiting at this time. Pt. Awake and denies pain.  1200: Pt. Having gas placed on bed pan. No BM   1210: GI at bedside updates given clear liquids, prep, colonscopy tomorrow. Assisted pt. In getting dentures in and glasses on.  1220: Report called to RN on floor. Transport arranged.   1236: Handoff to Quique FERRARI

## 2020-12-15 NOTE — ANESTHESIA PREPROCEDURE EVALUATION
Relevant Problems   Other   (+) Acute blood loss anemia   (+) Elevated BUN   (+) Elevated troponin   (+) Upper GI bleed       Physical Exam    Airway   Mallampati: II  TM distance: >3 FB  Neck ROM: full       Cardiovascular - normal exam  Rhythm: regular  Rate: normal  (-) murmur     Dental - normal exam  (+) upper dentures, lower dentures           Pulmonary - normal exam  Breath sounds clear to auscultation     Abdominal    Neurological - normal exam                 Anesthesia Plan    ASA 2       Plan - general       Airway plan will be natural airway        Induction: intravenous      Pertinent diagnostic labs and testing reviewed    Informed Consent:    Anesthetic plan and risks discussed with patient.

## 2020-12-15 NOTE — DISCHARGE PLANNING
"CTT assessment done with pt's daughter (Tori). She states her mom lives alone and is very independent (cooks, drives, gets her nails done). She does have a FWW from a hip surgery a few years ago but she doesn't use it.    Care Transition Team Assessment    Information Source  Orientation : Unable to Assess  Information Given By: Relative  Informant's Name: Tori- dtr  Who is responsible for making decisions for patient? : Patient    Readmission Evaluation  Is this a readmission?: No    Elopement Risk  Legal Hold: No  Ambulatory or Self Mobile in Wheelchair: No-Not an Elopement Risk    Interdisciplinary Discharge Planning  Does Admitting Nurse Feel This Could be a Complex Discharge?: No  Primary Care Physician: O\"Sadi  Lives with - Patient's Self Care Capacity: Alone and Able to Care For Self  Support Systems: Children, Friends / Neighbors  Housing / Facility: Mobile Home  Do You Take your Prescribed Medications Regularly: No  Reasons Why Not Taking Medications : (not on any)  Able to Return to Previous ADL's: Yes  Mobility Issues: No  Prior Services: None  Patient Prefers to be Discharged to:: home  Assistance Needed: Unknown at this Time  Durable Medical Equipment: Not Applicable    Discharge Preparedness  What is your plan after discharge?: Home with help  What are your discharge supports?: Child  Prior Functional Level: Independent with Activities of Daily Living  Difficulity with ADLs: None  Difficulity with IADLs: None    Functional Assesment  Prior Functional Level: Independent with Activities of Daily Living    Finances  Financial Barriers to Discharge: No  Prescription Coverage: Yes    Advance Directive  Advance Directive?: POLST    Domestic Abuse  Have you ever been the victim of abuse or violence?: No    Discharge Risks or Barriers  Discharge risks or barriers?: No    Anticipated Discharge Information  Discharge Disposition: Still a Patient (30)  Discharge Address: [address] [place] [address]  Discharge " Contact Phone Number: [phone]

## 2020-12-15 NOTE — PROGRESS NOTES
Updated Dr. Menard about pt. Hgb=8.9 post 2 units of blood. Pt. Also had 1x of bloody BM at this time. No new orders per MD.

## 2020-12-15 NOTE — CONSULTS
DATE OF SERVICE:  12/14/2020     GI CONSULT     INDICATION FOR THE CONSULT:  GI bleeding.     REFERRING PHYSICIAN:  Nasir Smith DO from the ED.     HISTORY OF PRESENT ILLNESS:  The patient is a 89-year-old female, who last   Thursday, started noticing some black tarry diarrheal-type stools.  She   started feeling increasingly weak, thus presented today for further   evaluation.  She was found to have a hemoglobin of 5.5 with an MCV of 95.7.    The patient denies any previous history of GI bleeding.  She does use Advil   for aches and pains on occasion.  She denies use of any kind of antiacid-type   medication, as she does not have any chronic upper GI-type symptoms.  She has   never had a colonoscopy in the past.  She denies any abdominal pain, any   weight changes.  She has had no abdominal surgeries.     PAST MEDICAL HISTORY:  Significant for hypertension.     PAST SURGICAL HISTORY:  Unremarkable for any abdominal surgery, though she did   have a hip surgery in 2015 on the right side.     MEDICATIONS:  At home include vitamin C, calcium, Tylenol, Advil p.r.n.     ALLERGIES:  No known drug allergies.     SOCIAL HISTORY:  Negative for any tobacco.  Drinks alcohol minimally.     FAMILY HISTORY:  Noncontributory for any GI disease.     REVIEW OF SYSTEMS:  Significant for weakness, some recent occasional nausea,   and a black diarrheal-type stools.  She also has experienced some lower   extremity pains around the time that she started having some diarrhea on   Thursday.  Otherwise, a complete review of systems is unremarkable.     PHYSICAL EXAMINATION:    VITAL SIGNS:  Temperature is 36.5, pulse 82, respirations 17, blood pressure   is 136/67.  GENERAL:  This is a very pale-appearing elderly female, who is otherwise alert   and oriented, in no apparent distress.  HEENT:  Sclerae anicteric.  LUNGS:  Clear.  CARDIAC:  Regular rhythm,  ABDOMEN:  Soft, nontender, nondistended.  EXTREMITIES:  No rashes or edema  present.  Pulses are present in distal   extremities bilaterally.     LABORATORY DATA:  As above in HPI.     ASSESSMENT AND PLAN:  1.  Severe symptomatic normocytic anemia.  2.  Black-type stools.  3.  History of hypertension.  4.  Advanced age.     DISCUSSION:  The patient is a 89-year-old female with a fairly recent onset   melena, and now with severe anemia.  Given the melena and elevated BUN of 42,   it is likely that she is experiencing an upper GI bleed, perhaps related to   peptic ulcer disease associated with her NSAID use.  In any case, she appears   to be fairly stable at this time, we will transfuse to get her hemoglobin   greater than 7.  We will place her on IV PPI therapy, get her scheduled for an   upper endoscopy electively, likely tomorrow.  We discussed the upper   endoscopy with the patient, she is agreeable to proceed.     Thank you for allowing us to participate in the care of the patient.        ______________________________  MD ARELY CHAUDHARI/MAYCOL    DD:  12/14/2020 15:41  DT:  12/14/2020 19:30    Job#:  [id number]

## 2020-12-15 NOTE — ANESTHESIA POSTPROCEDURE EVALUATION
Patient: Sherri Balbuena    Procedure Summary     Date: 12/15/20 Room / Location: [facility] ROOM 26 / SURGERY SAME DAY [facility]    Anesthesia Start: 1129 Anesthesia Stop: 1147    Procedure: GASTROSCOPY (N/A Esophagus) Diagnosis: (gastritis, hiatal hernia)    Surgeons: Ganga Harris M.D. Responsible Provider: Rupesh De León M.D.    Anesthesia Type: general ASA Status: 2          Final Anesthesia Type: general  Last vitals  BP   Blood Pressure : 153/49    Temp   36.4 °C (97.5 °F)    Pulse   Pulse: 83   Resp   18    SpO2   95 %      Anesthesia Post Evaluation    Patient location during evaluation: PACU  Patient participation: complete - patient participated  Level of consciousness: awake and alert  Pain score: 0    Airway patency: patent  Anesthetic complications: no  Cardiovascular status: hemodynamically stable  Respiratory status: acceptable  Hydration status: euvolemic    PONV: none           Nurse Pain Score: 0 (NPRS)

## 2020-12-15 NOTE — ANESTHESIA TIME REPORT
Anesthesia Start and Stop Event Times     Date Time Event    12/15/2020 1126 Ready for Procedure     1129 Anesthesia Start     1147 Anesthesia Stop        Responsible Staff  12/15/20    Name Role Begin End    Rupesh De León M.D. Anesth 1129 1147        Preop Diagnosis (Free Text):  Pre-op Diagnosis     GI bleeding        Preop Diagnosis (Codes):    Post op Diagnosis  GI bleed      Premium Reason  Non-Premium    Comments:                                                                        No

## 2020-12-15 NOTE — PROGRESS NOTES
2 RN skin check completed with Blossom FERRARI.   Devices in place glasses.  Skin assessed under devices yes.  Confirmed pressure ulcers found on none.  New potential pressure ulcers noted on n/a. Wound consult placed n/a.  Skin assessment  -overall skin is intact, some redness on the sacral area. Pt. Is up with FWW and prefers to get up for bathroom privileges. Encouraged use of barrier cream.

## 2020-12-15 NOTE — OR SURGEON
Immediate Post OP Note    PreOp Diagnosis: melena, anemia    PostOp Diagnosis: Schatzki's ring, large hiatal hernia, erosive gastritis.  No obvious source of significant bleeding.    Procedure(s):  GASTROSCOPY - Wound Class: Clean Contaminated    Surgeon(s):  Ganga Harris M.D.    Anesthesiologist/Type of Anesthesia:  Anesthesiologist: Rupesh De León M.D./General    Surgical Staff:  Endoscopy Technician: Katharina Villatoro  Endoscopy Nurse: Greg Lyons R.N.    Specimens removed if any:  * No specimens in log *    Estimated Blood Loss: minimal    Findings: as above, o/w normal    Complications: none    Plan: prep today for colonoscopy tomorrow.        12/15/2020 11:45 AM Ganga Harris M.D.

## 2020-12-15 NOTE — OR NURSING
1110- Pt arrived with transport. A, A & O x3, reviewed the plan of care with the pt. VSS, nose and mouth wash done. Consents signed.

## 2020-12-15 NOTE — OP REPORT
DATE OF SERVICE:  12/15/2020     INDICATION FOR THE PROCEDURE:  Melena and anemia.     CONSENT:  Informed consent was obtained directly from the patient after   benefits, risks and possible alternatives were discussed.  Medications the   patient received propofol-based regimen from anesthesia.  Please see their   notes for full details.     DESCRIPTION OF PROCEDURE:  The patient was placed in the left lateral   decubitus position and provided with oxygen via nasal cannula.  When ready,   the upper endoscope was placed in the patient's mouth and advanced easily and   carefully to the esophagus and subsequently to the stomach and duodenum.     FINDINGS:  At the GE junction, there was a tight Schatzki's ring, which could   be traversed using the diagnostic upper endoscope, this did cause a bit of a   dilation with some mild bleeding of his strictured area.  A large hiatal   hernia was seen, best viewed on retroflexed view of the cardia.  Otherwise,   the stomach was empty, there was some distal erosive gastritis were otherwise   normal.  Retroflexed views of the angularis and cardia were obtained.    Duodenum was empty and appeared normal to the third portion.  Villous   structures were grossly normal throughout.  The scope was then withdrawn after   excess air was removed from the gastric lumen.     COMPLICATIONS:  No complications during or in the postoperative period.     IMPRESSION:  1.  Erosive distal gastritis.  2.  Large hiatal hernia.  3.  Incidental finding of tight Schatzki's ring, mild therapeutic dilation   using the scope itself.     RECOMMENDATIONS:  We will plan on prepping today for a colonoscopy tomorrow.        ______________________________  MD ARELY CHAUDHARI/MERCEDES/DASHA    DD:  12/15/2020 11:50  DT:  12/15/2020 12:13    Job#:  [id number]

## 2020-12-15 NOTE — CARE PLAN
Problem: Bowel/Gastric:  Goal: Normal bowel function is maintained or improved  Outcome: PROGRESSING SLOWER THAN EXPECTED  Pt. Still having hematochezia, MD aware. Monitoring H and H     Problem: Safety  Goal: Will remain free from injury  Outcome: PROGRESSING AS EXPECTED  Goal: Will remain free from falls  Outcome: PROGRESSING AS EXPECTED  Educated about fall risks and precautions, bed alarm is on. Pt. On desk bed     Problem: Knowledge Deficit  Goal: Knowledge of disease process/condition, treatment plan, diagnostic tests, and medications will improve  Outcome: PROGRESSING AS EXPECTED  Goal: Knowledge of the prescribed therapeutic regimen will improve  Outcome: PROGRESSING AS EXPECTED   POC discussed with pt. Monitoring BM and and H and H

## 2020-12-16 ENCOUNTER — ANESTHESIA (OUTPATIENT)
Dept: SURGERY | Facility: MEDICAL CENTER | Age: 85
DRG: 378 | End: 2020-12-16
Payer: MEDICARE

## 2020-12-16 LAB — HGB BLD-MCNC: 7.4 G/DL (ref 12–16)

## 2020-12-16 PROCEDURE — A9270 NON-COVERED ITEM OR SERVICE: HCPCS | Performed by: HOSPITALIST

## 2020-12-16 PROCEDURE — 700105 HCHG RX REV CODE 258: Performed by: ANESTHESIOLOGY

## 2020-12-16 PROCEDURE — 700105 HCHG RX REV CODE 258: Performed by: HOSPITALIST

## 2020-12-16 PROCEDURE — 160035 HCHG PACU - 1ST 60 MINS PHASE I: Performed by: INTERNAL MEDICINE

## 2020-12-16 PROCEDURE — 0DJD8ZZ INSPECTION OF LOWER INTESTINAL TRACT, VIA NATURAL OR ARTIFICIAL OPENING ENDOSCOPIC: ICD-10-PCS | Performed by: INTERNAL MEDICINE

## 2020-12-16 PROCEDURE — 700101 HCHG RX REV CODE 250: Performed by: INTERNAL MEDICINE

## 2020-12-16 PROCEDURE — 85018 HEMOGLOBIN: CPT

## 2020-12-16 PROCEDURE — 700111 HCHG RX REV CODE 636 W/ 250 OVERRIDE (IP): Performed by: ANESTHESIOLOGY

## 2020-12-16 PROCEDURE — 700111 HCHG RX REV CODE 636 W/ 250 OVERRIDE (IP): Performed by: HOSPITALIST

## 2020-12-16 PROCEDURE — 160036 HCHG PACU - EA ADDL 30 MINS PHASE I: Performed by: INTERNAL MEDICINE

## 2020-12-16 PROCEDURE — 160048 HCHG OR STATISTICAL LEVEL 1-5: Performed by: INTERNAL MEDICINE

## 2020-12-16 PROCEDURE — 160203 HCHG ENDO MINUTES - 1ST 30 MINS LEVEL 4: Performed by: INTERNAL MEDICINE

## 2020-12-16 PROCEDURE — 770006 HCHG ROOM/CARE - MED/SURG/GYN SEMI*

## 2020-12-16 PROCEDURE — 160002 HCHG RECOVERY MINUTES (STAT): Performed by: INTERNAL MEDICINE

## 2020-12-16 PROCEDURE — 99233 SBSQ HOSP IP/OBS HIGH 50: CPT | Performed by: HOSPITALIST

## 2020-12-16 PROCEDURE — C9113 INJ PANTOPRAZOLE SODIUM, VIA: HCPCS | Performed by: HOSPITALIST

## 2020-12-16 PROCEDURE — 700102 HCHG RX REV CODE 250 W/ 637 OVERRIDE(OP): Performed by: HOSPITALIST

## 2020-12-16 PROCEDURE — 36415 COLL VENOUS BLD VENIPUNCTURE: CPT

## 2020-12-16 PROCEDURE — 160009 HCHG ANES TIME/MIN: Performed by: INTERNAL MEDICINE

## 2020-12-16 RX ORDER — OMEPRAZOLE 20 MG/1
40 CAPSULE, DELAYED RELEASE ORAL 2 TIMES DAILY
Status: DISCONTINUED | OUTPATIENT
Start: 2020-12-16 | End: 2020-12-17 | Stop reason: HOSPADM

## 2020-12-16 RX ORDER — HALOPERIDOL 5 MG/ML
1 INJECTION INTRAMUSCULAR
Status: DISCONTINUED | OUTPATIENT
Start: 2020-12-16 | End: 2020-12-16 | Stop reason: HOSPADM

## 2020-12-16 RX ORDER — SODIUM CHLORIDE, SODIUM LACTATE, POTASSIUM CHLORIDE, CALCIUM CHLORIDE 600; 310; 30; 20 MG/100ML; MG/100ML; MG/100ML; MG/100ML
INJECTION, SOLUTION INTRAVENOUS CONTINUOUS
Status: DISCONTINUED | OUTPATIENT
Start: 2020-12-16 | End: 2020-12-16 | Stop reason: HOSPADM

## 2020-12-16 RX ORDER — DIPHENHYDRAMINE HYDROCHLORIDE 50 MG/ML
12.5 INJECTION INTRAMUSCULAR; INTRAVENOUS
Status: DISCONTINUED | OUTPATIENT
Start: 2020-12-16 | End: 2020-12-16 | Stop reason: HOSPADM

## 2020-12-16 RX ORDER — SODIUM CHLORIDE, SODIUM LACTATE, POTASSIUM CHLORIDE, CALCIUM CHLORIDE 600; 310; 30; 20 MG/100ML; MG/100ML; MG/100ML; MG/100ML
INJECTION, SOLUTION INTRAVENOUS
Status: DISCONTINUED | OUTPATIENT
Start: 2020-12-16 | End: 2020-12-16 | Stop reason: SURG

## 2020-12-16 RX ORDER — ONDANSETRON 2 MG/ML
4 INJECTION INTRAMUSCULAR; INTRAVENOUS
Status: DISCONTINUED | OUTPATIENT
Start: 2020-12-16 | End: 2020-12-16 | Stop reason: HOSPADM

## 2020-12-16 RX ADMIN — PANTOPRAZOLE SODIUM 8 MG/HR: 40 INJECTION, POWDER, FOR SOLUTION INTRAVENOUS at 05:08

## 2020-12-16 RX ADMIN — OMEPRAZOLE 40 MG: 20 CAPSULE, DELAYED RELEASE ORAL at 17:48

## 2020-12-16 RX ADMIN — PROPOFOL 50 MG: 10 INJECTION, EMULSION INTRAVENOUS at 10:32

## 2020-12-16 RX ADMIN — PROPOFOL 50 MG: 10 INJECTION, EMULSION INTRAVENOUS at 10:38

## 2020-12-16 RX ADMIN — POLYETHYLENE GLYCOL 3350, SODIUM SULFATE ANHYDROUS, SODIUM BICARBONATE, SODIUM CHLORIDE, POTASSIUM CHLORIDE 2 L: 236; 22.74; 6.74; 5.86; 2.97 POWDER, FOR SOLUTION ORAL at 02:28

## 2020-12-16 RX ADMIN — SODIUM CHLORIDE, POTASSIUM CHLORIDE, SODIUM LACTATE AND CALCIUM CHLORIDE: 600; 310; 30; 20 INJECTION, SOLUTION INTRAVENOUS at 10:26

## 2020-12-16 RX ADMIN — PROPOFOL 50 MG: 10 INJECTION, EMULSION INTRAVENOUS at 10:28

## 2020-12-16 ASSESSMENT — ENCOUNTER SYMPTOMS
COUGH: 0
DIZZINESS: 0
VOMITING: 0
NAUSEA: 0
ABDOMINAL PAIN: 0
WEAKNESS: 0
MEMORY LOSS: 0
DEPRESSION: 0
SHORTNESS OF BREATH: 0
FEVER: 0
MUSCULOSKELETAL NEGATIVE: 1
HEADACHES: 0

## 2020-12-16 ASSESSMENT — PAIN DESCRIPTION - PAIN TYPE
TYPE: SURGICAL PAIN
TYPE: SURGICAL PAIN
TYPE: ACUTE PAIN
TYPE: ACUTE PAIN
TYPE: SURGICAL PAIN

## 2020-12-16 ASSESSMENT — PAIN SCALES - GENERAL: PAIN_LEVEL: 0

## 2020-12-16 NOTE — ANESTHESIA TIME REPORT
Anesthesia Start and Stop Event Times     Date Time Event    12/16/2020 1026 Ready for Procedure     1026 Anesthesia Start     1055 Anesthesia Stop        Responsible Staff  12/16/20    Name Role Begin End    Larisa Gipson M.D. Anesth 1026 1055        Preop Diagnosis (Free Text):  Pre-op Diagnosis     melena, anemia        Preop Diagnosis (Codes):    Post op Diagnosis  GI bleed      Premium Reason  Non-Premium    Comments:

## 2020-12-16 NOTE — PROGRESS NOTES
Pt A&O 4. Room air. Pt denied any pain today. VSS. Pt had many 12 bloody bowel movements today. Assit 1 with FWW.

## 2020-12-16 NOTE — PROGRESS NOTES
[facility] Medicine Daily Progress Note    Date of Service  12/15/2020    Chief Complaint  89 y.o. female admitted 12/14/2020 with fairly severe anemia with a hemoglobin of 5.5.  There was concern of upper GI bleed.    Hospital Course  89 y.o. female who presented 12/14/2020 with past medical history of hypertension who presents to the hospital with black tarry stools.  This patient has had weakness dizziness shortness of breath and black tarry stools for the last 5 days.  She usually uses a walker to ambulate.  However due to her worsening hip pain she started taking Advil more frequently.  She has never had a history of a GI bleed in the past.  She denies any use of any blood thinners.  Denies any abdominal pain.  No nausea no vomiting. Otherwise no known alleviating or exacerbating factors to her symptoms    Interval Problem Update  No acute overnight events.  Endoscopy was well-tolerated this morning, plan is for colonoscopy tomorrow.  She was getting prepped currently.    Consultants/Specialty  GI    Code Status  DNAR/DNI    Disposition  Pending colonoscopy findings.    Review of Systems   All systems reviewed and negative except as noted per above.    Physical Exam  Temp:  [36.3 °C (97.4 °F)-37.1 °C (98.8 °F)] 36.7 °C (98.1 °F)  Pulse:  [68-83] 73  Resp:  [16-20] 16  BP: ()/(42-70) 137/50  SpO2:  [92 %-100 %] 94 %    General: No acute distress  HEENT atraumatic, normocephalic, pupils equal round reactive to light  Neck: No JVD  Chest: Respirations are unlabored  Cardiac: Physiologic S1 and S2  Abdomen: Soft, nontender, nondistended  Extremities: Without clubbing, cyanosis or edema  Neuro: Cranial nerves II through XII are grossly intact.  Psych: No anxiety, judgement intact.        Fluids    Intake/Output Summary (Last 24 hours) at 12/15/2020 1639  Last data filed at 12/15/2020 1246  Gross per 24 hour   Intake 150 ml   Output --   Net 150 ml       Laboratory  Recent Labs     12/14/20  1250 12/14/20  9430  12/15/20  0652 12/15/20  1539   WBC 8.8  --  8.6  --    RBC 1.85*  --  2.96*  --    HEMOGLOBIN 5.5* 8.9* 9.0* 7.8*   HEMATOCRIT 17.5*  --  26.8*  --    MCV 95.7  --  90.5  --    MCH 29.7  --  30.4  --    MCHC 31.1*  --  33.6  --    RDW 49.2  --  49.7  --    PLATELETCT 219  --  179  --    MPV 12.2  --  11.7  --      Recent Labs     12/14/20  1250 12/15/20  0652   SODIUM 138 141   POTASSIUM 5.0 4.1   CHLORIDE 106 113*   CO2 21 22   GLUCOSE 125* 99   BUN 42* 37*   CREATININE 1.05 0.83   CALCIUM 9.3 8.9     Recent Labs     12/14/20  1250   APTT 23.2*   INR 0.99               Imaging  DX-CHEST-PORTABLE (1 VIEW)   Final Result      Patchy ill-defined bilateral pulmonary parenchymal opacities. These findings can be seen in the clinical setting of Covid pneumonia.       EGD Findings   PostOp Diagnosis: Schatzki's ring, large hiatal hernia, erosive gastritis.  No obvious source of significant bleeding.    Assessment/Plan  * Upper GI bleed  Assessment & Plan  Suspect upper GI bleed due to excessive nsaid use   Cont with serial hgb   Transfuse 2 unit prbc now   IV PPI continous for now   GI consulted by ERP, EGD HD#1 per above.     COVID-19  Assessment & Plan  Xray with covid 19   Patient asymptomatic   Check covid swab prior to admission    Elevated troponin  Assessment & Plan  Demand from anemia   No chest pain     Acute blood loss anemia  Assessment & Plan  Due to GI bleed   Cont to trend hgb   Transfuse 2 units prbc now       Elevated BUN  Assessment & Plan  Due to gi bleed  Cont to monitor       VTE prophylaxis: SCDs

## 2020-12-16 NOTE — PROGRESS NOTES
Bedside report received at change of shift. Pt is A&Ox4, reports no pain. Pt has ambulated 4 times to the restroom since change of shift and the bowel movements are bright red, large, and watery. Bed alarm on, bed in low locked position, non-slip socks in place, call light within reach. All pt needs met at this time.

## 2020-12-16 NOTE — NON-PROVIDER
Progress Note     Date of Service  12/16/2020     Consultants  GI      Code Status  DNAR/DNI     Chief Complaint       Chief Complaint   Patient presents with   • Diarrhea       x5 days   • Shortness of Breath         History of Presenting Illness  89 y.o. female who presented 12/14/2020 with 5 day history of  diarrhea. The patient also reported associated bilateral leg pain at the onset of the diarrhea. Pt stated she would have diarrhea 3-4 times a day throughout the day. Pt stated the diarrhea looked dark and black. Pt stated she had no pain on defecation. She has never had a history of this type of diarrhea. Denies any abdominal pain. Pt stated she began using Advil 6 times daily (3 in morning and 3 at night) due to leg pain which she rated at a 10/10.  Pt reported no nausea no vomiting. Pt reported no dizziness or lightheadedness.      Current Status:  12/16 11:07AM  Pt had colonscopy done at 11:07am for GI bleeding and anemia. No blood loss noted per report. Post op diagnosis: pan-diverticulosis, tortuous colon.   12/16 1:00PM  Pt is currently in room, post-colonoscopy. Pt appears pleasant and in good spirits. Pt is A&Ox4. Pt reports she has no pain in any region of her body at this time. Pt has not yet had a bowel movement but has been passing gas.    Review of Systems  Review of Systems   Constitutional: Negative for fever and malaise/fatigue.   Respiratory: Negative for cough and shortness of breath.    Cardiovascular: Negative for chest pain and leg swelling.   Gastrointestinal: Negative for abdominal pain, nausea and vomiting.   Musculoskeletal: Negative.    Neurological: Negative for dizziness, weakness and headaches.   Psychiatric/Behavioral: Negative for depression and memory loss.         Past Medical History  Past Medical History:   Diagnosis Date   • Hypertension      Surgical History   has a past surgical history that includes hip nailing intramedullary (Right, 5/23/2015).      Family  History  Reviewed and not pertinent     Social History   Pt is no longer working as of this year.  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 She reports that she has never smoked. She has never used smokeless tobacco. She reports current alcohol use of about 0.6 oz of alcohol per week. She reports that she does not use drugs.     Allergies  No Known Allergies     Medications    Current Facility-Administered Medications:   •  omeprazole (PRILOSEC) capsule 40 mg, 40 mg, Oral, BID, Kenneth Alatorre M.D.  •  ondansetron (ZOFRAN) syringe/vial injection 4 mg, 4 mg, Intravenous, Q4HRS PRN, Ewa Diop M.D.  •  ondansetron (ZOFRAN ODT) dispertab 4 mg, 4 mg, Oral, Q4HRS PRN, Ewa Diop M.D.     Current Outpatient Medications   Medication Instructions   • acetaminophen (TYLENOL) 650 mg, Oral, EVERY 6 HOURS PRN   • ascorbic acid (VITAMIN C) 500 mg, Oral, DAILY   • calcium carbonate (OS-FOREST 500) 1,000 mg, Oral, 2 TIMES DAILY WITH MEALS   • ibuprofen (MOTRIN) 600 mg, Oral, 2 TIMES DAILY PRN   • multivitamin (THERAGRAN) Tab 1 Tab, Oral, DAILY   • NEOMYCIN-POLYMYXIN-HC, OTIC, (CORTISPORIN) 1 % Solution 4 Drops, Otic, 3 TIMES DAILY   Labs  Recent Labs     12/14/20  1250 12/14/20  1250 12/15/20  0652 12/15/20  1539 12/15/20  2313 12/16/20  0715   WBC 8.8  --  8.6  --   --   --    RBC 1.85*  --  2.96*  --   --   --    HEMOGLOBIN 5.5*   < > 9.0* 7.8* 7.3* 7.4*   HEMATOCRIT 17.5*  --  26.8*  --   --   --    MCV 95.7  --  90.5  --   --   --    MCH 29.7  --  30.4  --   --   --    RDW 49.2  --  49.7  --   --   --    PLATELETCT 219  --  179  --   --   --    MPV 12.2  --  11.7  --   --   --    NEUTSPOLYS 88.20*  --  75.80*  --   --   --    LYMPHOCYTES 6.70*  --  13.00*  --   --   --    MONOCYTES 4.20  --  8.60  --   --   --    EOSINOPHILS 0.00  --  0.60  --   --   --    BASOPHILS 0.20  --  0.40  --   --   --     < > = values in this interval not displayed.     Plan   1. Recheck Hb   2.  If no  further bleeding, discharge home   3.  Schedule outpatient endoscopy to confirm bleeding has stopped   4. Advise pt to limit NSAID usage

## 2020-12-16 NOTE — ANESTHESIA POSTPROCEDURE EVALUATION
Patient: Sherri Balbuena    Procedure Summary     Date: 12/16/20 Room / Location: [facility] ROOM 26 / SURGERY SAME DAY [facility]    Anesthesia Start: 1026 Anesthesia Stop: 1055    Procedure: COLONOSCOPY (N/A Anus) Diagnosis: (diverticulosis)    Surgeons: Ganga Harris M.D. Responsible Provider: Larisa Gipson M.D.    Anesthesia Type: MAC ASA Status: 2          Final Anesthesia Type: MAC  Last vitals  BP   Blood Pressure : 127/54    Temp   36.3 °C (97.3 °F)    Pulse   Pulse: 69   Resp   18    SpO2   98 %      Anesthesia Post Evaluation    Patient location during evaluation: PACU  Patient participation: complete - patient participated  Level of consciousness: awake and alert  Pain score: 0    Airway patency: patent  Anesthetic complications: no  Cardiovascular status: hemodynamically stable  Respiratory status: acceptable  Hydration status: euvolemic    PONV: none           Nurse Pain Score: 0 (NPRS)

## 2020-12-16 NOTE — OR SURGEON
Immediate Post OP Note    PreOp Diagnosis: gi bleeding, anemia    PostOp Diagnosis: pan-diverticulosis, tortuous colon    Procedure(s):  COLONOSCOPY - Wound Class: Clean Contaminated    Surgeon(s):  Ganga Harris M.D.    Anesthesiologist/Type of Anesthesia:  Anesthesiologist: Larisa Gipson M.D./General    Surgical Staff:  Endoscopy Technician: Aislinn Tobar  Endoscopy Nurse: Greg Lyons RHERNESTO; Markus Carrera RHERNESTO    Specimens removed if any:  * No specimens in log *    Estimated Blood Loss: none    Findings: as above, o/w normal    Complications: none    Plan:  If no further bleeding, consider d/c home.   Will arrange outpatient capsule endoscopy.  Will s/o - please call if needed.          12/16/2020 11:07 AM Ganga Harris M.D.

## 2020-12-16 NOTE — OR NURSING
1053 pt arrived from OR. Mask on 3L. LR and protonix infusing. Pt sleeping on left side. RR even and unlabored.     1105 Report given to VEGA Oglesby at [facility]-, all questions answered. Pt more alert and denies any pain/nausea. Warm blanket given for comfort. Mask removed and sats are 99% on RA.    1120 bed changed per patient request. No bowel movement, just gas. Denies pain/nausea. More warm blankets given per request.     1200 pt sitting up in stretcher and updated on findings. RR even and unlabored.    1208 transport at bedside to bring patient back to [facility] via gurney.

## 2020-12-16 NOTE — ANESTHESIA PREPROCEDURE EVALUATION
89 yo with GI bleed, upper endoscopy yesterday without bleedoing source, here for colonoscopy. Well controlled HTN, no other medical illnesses  NKDA  Meds:  Advil, vitamins    Relevant Problems   No relevant active problems       Physical Exam    Airway   Mallampati: I  TM distance: >3 FB  Neck ROM: full       Cardiovascular - normal exam  Rhythm: regular  Rate: normal  (-) murmur     Dental - normal exam      Very poor dentition   Pulmonary - normal exam  Breath sounds clear to auscultation     Abdominal    Neurological - normal exam                 Anesthesia Plan    ASA 2       Plan - MAC             Induction: intravenous    Postoperative Plan: Postoperative administration of opioids is intended.    Pertinent diagnostic labs and testing reviewed    Informed Consent:    Anesthetic plan and risks discussed with patient.    Use of blood products discussed with: patient whom consented to blood products.

## 2020-12-16 NOTE — PROGRESS NOTES
Report given to VEGA Spencer via phone.      Transport arrived at 0945, pt off unit for procedure.

## 2020-12-16 NOTE — PROGRESS NOTES
Hospital Medicine Daily Progress Note    Date of Service  12/16/2020    Chief Complaint  89 y.o. female admitted 12/14/2020 with fairly severe anemia with a hemoglobin of 5.5.  There was concern of upper GI bleed.    Hospital Course  89 y.o. female who presented 12/14/2020 with past medical history of hypertension who presents to the hospital with black tarry stools.  This patient has had weakness dizziness shortness of breath and black tarry stools for the last 5 days.  She usually uses a walker to ambulate.  However due to her worsening hip pain she started taking Advil more frequently.  She has never had a history of a GI bleed in the past.  She denies any use of any blood thinners.  Denies any abdominal pain.  No nausea no vomiting. Otherwise no known alleviating or exacerbating factors to her symptoms    Interval Problem Update  No acute overnight events.  Endoscopy was again well-tolerated this morning,  Results below.     Consultants/Specialty  GI    Code Status  DNAR/DNI    Disposition  Likely to home in the morning if H&H stable.    Review of Systems   All systems reviewed and negative except as noted per above.    Physical Exam  Temp:  [36.4 °C (97.6 °F)-37.1 °C (98.8 °F)] 36.6 °C (97.8 °F)  Pulse:  [61-90] 68  Resp:  [16-18] 18  BP: (108-163)/(36-80) 139/54  SpO2:  [94 %-100 %] 97 %    General: No acute distress  HEENT atraumatic, normocephalic, pupils equal round reactive to light  Neck: No JVD  Chest: Respirations are unlabored  Cardiac: Physiologic S1 and S2  Abdomen: Soft, nontender, nondistended  Extremities: Without clubbing, cyanosis or edema  Neuro: Cranial nerves II through XII are grossly intact.  Psych: No anxiety, judgement intact.        Fluids    Intake/Output Summary (Last 24 hours) at 12/16/2020 1304  Last data filed at 12/16/2020 1054  Gross per 24 hour   Intake 300 ml   Output --   Net 300 ml       Laboratory  Recent Labs     12/14/20  1250 12/14/20  1250 12/15/20  0652 12/15/20  1532  12/15/20  2313 12/16/20  0715   WBC 8.8  --  8.6  --   --   --    RBC 1.85*  --  2.96*  --   --   --    HEMOGLOBIN 5.5*   < > 9.0* 7.8* 7.3* 7.4*   HEMATOCRIT 17.5*  --  26.8*  --   --   --    MCV 95.7  --  90.5  --   --   --    MCH 29.7  --  30.4  --   --   --    MCHC 31.1*  --  33.6  --   --   --    RDW 49.2  --  49.7  --   --   --    PLATELETCT 219  --  179  --   --   --    MPV 12.2  --  11.7  --   --   --     < > = values in this interval not displayed.     Recent Labs     12/14/20  1250 12/15/20  0652   SODIUM 138 141   POTASSIUM 5.0 4.1   CHLORIDE 106 113*   CO2 21 22   GLUCOSE 125* 99   BUN 42* 37*   CREATININE 1.05 0.83   CALCIUM 9.3 8.9     Recent Labs     12/14/20  1250   APTT 23.2*   INR 0.99               Imaging  DX-CHEST-PORTABLE (1 VIEW)   Final Result      Patchy ill-defined bilateral pulmonary parenchymal opacities. These findings can be seen in the clinical setting of Covid pneumonia.       EGD Findings   PostOp Diagnosis: Schatzki's ring, large hiatal hernia, erosive gastritis.  No obvious source of significant bleeding.    Assessment/Plan  * Upper GI bleed  Assessment & Plan  Suspect upper GI bleed due to excessive nsaid use   Hold serial hgb, recheck in AM.   Transfused 2 unit prbc  IV PPI continous to PO BID on HD#2.   GI consulted by ERP, EGD HD#1 per above, [place] HD#2 per above.     COVID-19  Assessment & Plan  Xray with covid 19   Patient asymptomatic   Check covid swab prior to admission    Elevated troponin  Assessment & Plan  Demand from anemia   No chest pain     Acute blood loss anemia  Assessment & Plan  Due to GI bleed   Cont to trend hgb, low but not at transfusion thresholds.        Elevated BUN  Assessment & Plan  Due to gi bleed  Cont to monitor       VTE prophylaxis: SCDs

## 2020-12-16 NOTE — ANESTHESIA QCDR
2019 [facility] Clinical Data Registry (for Quality Improvement)     Postoperative nausea/vomiting risk protocol (Adult = 18 yrs and Pediatric 3-17 yrs)- (430 and 463)  General inhalation anesthetic (NOT TIVA) with PONV risk factors: No  Provision of anti-emetic therapy with at least 2 different classes of agents: N/A  Patient DID NOT receive anti-emetic therapy and reason is documented in Medical Record: N/A    Multimodal Pain Management- (477)  Non-emergent surgery AND patient age >= 18: No  Use of Multimodal Pain Management, two or more drugs and/or interventions, NOT including systemic opioids:   Exception: Documented allergy to multiple classes of analgesics:     Smoking Abstinence (404)  Patient is current smoker (cigarette, pipe, e-cig, marijuanna): No  Elective Surgery:   Abstinence instructions provided prior to day of surgery:   Patient abstained from smoking on day of surgery:     Pre-Op Beta-Blocker in Isolated CABG (44)  Isolated CABG AND patient age >= 18: No  Beta-blocker admin within 24 hours of surgical incision:   Exception:of medical reason(s) for not administering beta blocker within 24 hours prior to surgical incision (e.g., not  indicated,other medical reason):     PACU assessment of acute postoperative pain prior to Anesthesia Care End- Applies to Patients Age = 18- (ABG7)  Initial PACU pain score is which of the following: < 7/10  Patient unable to report pain score: N/A    Post-anesthetic transfer of care checklist/protocol to PACU/ICU- (426 and 427)  Upon conclusion of case, patient transferred to which of the following locations: PACU/Non-ICU  Use of transfer checklist/protocol: Yes  Exclusion: Service Performed in Patient Hospital Room (and thus did not require transfer): N/A  Unplanned admission to ICU related to anesthesia service up through end of PACU care- (MD51)  Unplanned admission to ICU (not initially anticipated at anesthesia start time): No

## 2020-12-17 ENCOUNTER — PATIENT OUTREACH (OUTPATIENT)
Dept: HEALTH INFORMATION MANAGEMENT | Facility: OTHER | Age: 85
End: 2020-12-17

## 2020-12-17 ENCOUNTER — HOME HEALTH ADMISSION (OUTPATIENT)
Dept: HOME HEALTH SERVICES | Facility: HOME HEALTHCARE | Age: 85
End: 2020-12-17
Payer: MEDICARE

## 2020-12-17 VITALS
DIASTOLIC BLOOD PRESSURE: 53 MMHG | HEART RATE: 65 BPM | RESPIRATION RATE: 18 BRPM | OXYGEN SATURATION: 92 % | SYSTOLIC BLOOD PRESSURE: 142 MMHG | TEMPERATURE: 98.2 F | BODY MASS INDEX: 19.83 KG/M2 | WEIGHT: 105 LBS | HEIGHT: 61 IN

## 2020-12-17 PROBLEM — D62 ACUTE BLOOD LOSS ANEMIA: Status: RESOLVED | Noted: 2020-12-14 | Resolved: 2020-12-17

## 2020-12-17 PROBLEM — K92.2 UPPER GI BLEED: Status: RESOLVED | Noted: 2020-12-14 | Resolved: 2020-12-17

## 2020-12-17 PROBLEM — R79.89 ELEVATED TROPONIN: Status: RESOLVED | Noted: 2020-12-14 | Resolved: 2020-12-17

## 2020-12-17 PROBLEM — U07.1 COVID-19: Status: RESOLVED | Noted: 2020-12-14 | Resolved: 2020-12-17

## 2020-12-17 PROBLEM — R79.9 ELEVATED BUN: Status: RESOLVED | Noted: 2020-12-14 | Resolved: 2020-12-17

## 2020-12-17 LAB
ALBUMIN SERPL BCP-MCNC: 2.7 G/DL (ref 3.2–4.9)
ALBUMIN/GLOB SERPL: 1.4 G/DL
ALP SERPL-CCNC: 42 U/L (ref 30–99)
ALT SERPL-CCNC: 8 U/L (ref 2–50)
ANION GAP SERPL CALC-SCNC: 10 MMOL/L (ref 7–16)
AST SERPL-CCNC: 17 U/L (ref 12–45)
BASOPHILS # BLD AUTO: 0.5 % (ref 0–1.8)
BASOPHILS # BLD: 0.03 K/UL (ref 0–0.12)
BILIRUB SERPL-MCNC: 0.3 MG/DL (ref 0.1–1.5)
BUN SERPL-MCNC: 17 MG/DL (ref 8–22)
CALCIUM SERPL-MCNC: 8.2 MG/DL (ref 8.5–10.5)
CHLORIDE SERPL-SCNC: 109 MMOL/L (ref 96–112)
CO2 SERPL-SCNC: 23 MMOL/L (ref 20–33)
CREAT SERPL-MCNC: 0.65 MG/DL (ref 0.5–1.4)
EOSINOPHIL # BLD AUTO: 0.23 K/UL (ref 0–0.51)
EOSINOPHIL NFR BLD: 3.9 % (ref 0–6.9)
ERYTHROCYTE [DISTWIDTH] IN BLOOD BY AUTOMATED COUNT: 48 FL (ref 35.9–50)
ERYTHROCYTE [DISTWIDTH] IN BLOOD BY AUTOMATED COUNT: 51.8 FL (ref 35.9–50)
GLOBULIN SER CALC-MCNC: 2 G/DL (ref 1.9–3.5)
GLUCOSE SERPL-MCNC: 72 MG/DL (ref 65–99)
HCT VFR BLD AUTO: 21.1 % (ref 37–47)
HCT VFR BLD AUTO: 26.4 % (ref 37–47)
HGB BLD-MCNC: 6.8 G/DL (ref 12–16)
HGB BLD-MCNC: 8.7 G/DL (ref 12–16)
IMM GRANULOCYTES # BLD AUTO: 0.03 K/UL (ref 0–0.11)
IMM GRANULOCYTES NFR BLD AUTO: 0.5 % (ref 0–0.9)
LYMPHOCYTES # BLD AUTO: 1.25 K/UL (ref 1–4.8)
LYMPHOCYTES NFR BLD: 21.4 % (ref 22–41)
MCH RBC QN AUTO: 30 PG (ref 27–33)
MCH RBC QN AUTO: 30.2 PG (ref 27–33)
MCHC RBC AUTO-ENTMCNC: 32.2 G/DL (ref 33.6–35)
MCHC RBC AUTO-ENTMCNC: 33 G/DL (ref 33.6–35)
MCV RBC AUTO: 91 FL (ref 81.4–97.8)
MCV RBC AUTO: 93.8 FL (ref 81.4–97.8)
MONOCYTES # BLD AUTO: 0.62 K/UL (ref 0–0.85)
MONOCYTES NFR BLD AUTO: 10.6 % (ref 0–13.4)
NEUTROPHILS # BLD AUTO: 3.69 K/UL (ref 2–7.15)
NEUTROPHILS NFR BLD: 63.1 % (ref 44–72)
NRBC # BLD AUTO: 0.02 K/UL
NRBC BLD-RTO: 0.3 /100 WBC
PLATELET # BLD AUTO: 186 K/UL (ref 164–446)
PLATELET # BLD AUTO: 196 K/UL (ref 164–446)
PMV BLD AUTO: 10.8 FL (ref 9–12.9)
PMV BLD AUTO: 10.9 FL (ref 9–12.9)
POTASSIUM SERPL-SCNC: 3.1 MMOL/L (ref 3.6–5.5)
PROT SERPL-MCNC: 4.7 G/DL (ref 6–8.2)
RBC # BLD AUTO: 2.25 M/UL (ref 4.2–5.4)
RBC # BLD AUTO: 2.9 M/UL (ref 4.2–5.4)
SODIUM SERPL-SCNC: 142 MMOL/L (ref 135–145)
WBC # BLD AUTO: 5.9 K/UL (ref 4.8–10.8)
WBC # BLD AUTO: 6.9 K/UL (ref 4.8–10.8)

## 2020-12-17 PROCEDURE — 36415 COLL VENOUS BLD VENIPUNCTURE: CPT

## 2020-12-17 PROCEDURE — 86923 COMPATIBILITY TEST ELECTRIC: CPT

## 2020-12-17 PROCEDURE — A9270 NON-COVERED ITEM OR SERVICE: HCPCS | Performed by: HOSPITALIST

## 2020-12-17 PROCEDURE — 700102 HCHG RX REV CODE 250 W/ 637 OVERRIDE(OP): Performed by: HOSPITALIST

## 2020-12-17 PROCEDURE — 85027 COMPLETE CBC AUTOMATED: CPT

## 2020-12-17 PROCEDURE — 85025 COMPLETE CBC W/AUTO DIFF WBC: CPT

## 2020-12-17 PROCEDURE — 36430 TRANSFUSION BLD/BLD COMPNT: CPT

## 2020-12-17 PROCEDURE — 99239 HOSP IP/OBS DSCHRG MGMT >30: CPT | Performed by: HOSPITALIST

## 2020-12-17 PROCEDURE — P9016 RBC LEUKOCYTES REDUCED: HCPCS

## 2020-12-17 PROCEDURE — 80053 COMPREHEN METABOLIC PANEL: CPT

## 2020-12-17 PROCEDURE — 700105 HCHG RX REV CODE 258: Performed by: STUDENT IN AN ORGANIZED HEALTH CARE EDUCATION/TRAINING PROGRAM

## 2020-12-17 RX ORDER — SODIUM CHLORIDE 9 MG/ML
INJECTION, SOLUTION INTRAVENOUS CONTINUOUS
Status: ACTIVE | OUTPATIENT
Start: 2020-12-17 | End: 2020-12-17

## 2020-12-17 RX ORDER — OMEPRAZOLE 40 MG/1
40 CAPSULE, DELAYED RELEASE ORAL 2 TIMES DAILY
Qty: 120 CAP | Refills: 0 | Status: SHIPPED | OUTPATIENT
Start: 2020-12-17 | End: 2021-08-12

## 2020-12-17 RX ADMIN — OMEPRAZOLE 40 MG: 20 CAPSULE, DELAYED RELEASE ORAL at 04:18

## 2020-12-17 RX ADMIN — SODIUM CHLORIDE: 9 INJECTION, SOLUTION INTRAVENOUS at 02:27

## 2020-12-17 NOTE — PROGRESS NOTES
Community Health Worker Intake  • Patient has accepted [facility] services . CHW Placido sent referral via e-mail to [facility].   • CHW will not follow patient at this time.

## 2020-12-17 NOTE — PROGRESS NOTES
Pt's hgb resulted as 6.8 at 0150. Dr.Haris Waterman was notified immediately. 1 unit of RBCs was administered as ordered. No adventitious reaction was noted. VSS after 15 minutes of infusion and after the infusion was complete. hgb post transfusion resulted as 8.7. Pt is now resting comfortably in bed. No further needs at this time.

## 2020-12-17 NOTE — PROGRESS NOTES
Bedside report received at change of shift. Pt is A&Ox4, reports no pain. Assessment completed. Pt is currently sleeping comfortably in bed. Bed alarm is on, bed in low locked position. All pt needs met at this time.

## 2020-12-17 NOTE — DISCHARGE PLANNING
Care Transition Team Discharge Planning    Anticipated Discharge Information  Discharge Disposition: Discharged to home/self care (01)  Discharge Address: [facility] [address]  Discharge Contact Phone Number: [phone]              Discharge Plan:  Patient is discharging home to her prior living arrangement. She lives alone, and is very independent. Patient has a daughter April for support. Patient will discharge home with [facility], [facility], for CBC lab draw, choice document was faxed to [facility]. Patient will also have [facility] visit her in the home before Monday, they will set her up with a new PCP. IMM verbal consent given.     Kristel Warren RN,

## 2020-12-17 NOTE — DISCHARGE PLANNING
Good afternoon,  This referral has been escalated to a Clinical Supervisor for review in order to determine Home Health appropriateness.  This issue will be resolved as quickly as possible, but for any questions feel free to call us at [phone].  Thank you!

## 2020-12-17 NOTE — DISCHARGE SUMMARY
Discharge Summary    CHIEF COMPLAINT ON ADMISSION  Chief Complaint   Patient presents with   • Diarrhea     x5 days   • Shortness of Breath       Reason for Admission  EMS     Admission Date  12/14/2020    CODE STATUS  DNAR/DNI    HPI & HOSPITAL COURSE  For full details of admission please see the H&P of Dr. Ledbetter dated 12/14/2020, briefly, 89 y.o. female who presented 12/14/2020 with past medical history of hypertension who presents to the hospital with black tarry stools.  This patient has had weakness dizziness shortness of breath and black tarry stools for the last 5 days.  She usually uses a walker to ambulate.  However due to her worsening hip pain she started taking Advil more frequently.  She has never had a history of a GI bleed in the past.  She denies any use of any blood thinners.  Denies any abdominal pain.  No nausea no vomiting. Otherwise no known alleviating or exacerbating factors to her symptoms.  She presented to the hospital hemodynamically stable however her hemoglobin is 5.5 and her hematocrit is 17.5 her BUN is 42 she will be admitted to the hospital with GI consultation for suspected upper GI bleed.     She was seen by GI, underwent EGD on hospital day 1 which showed a large hiatal hernia, tight Schatzki's ring, and erosive distal gastritis.  Tolerated the procedure well.  There was concern that there may be a lower GI source, she underwent colonoscopy after adequate bowel prep on hospital day 2.  Findings were significant for small internal hemorrhoids and pandiverticulosis.  Later that night her H&H did touch transfusion thresholds and she responded well to 1 bag of PRBCs.  On hospital day 3 she was extraordinarily eager to depart and return home, she was adamant that she would follow strict return precautions should she experience any further symptoms of anemia, and would have close outpatient follow-up with a home health care visit within the coming 4 days to collect follow-up blood  work.    Therefore, she is discharged in fair and stable condition to home with organized home healthcare and close outpatient follow-up.    The patient met 2-midnight criteria for an inpatient stay at the time of discharge.    Discharge Date  12/17/20      FOLLOW UP ITEMS POST DISCHARGE  With HHRN, CBC to be drawn within the next 4 days.      DISCHARGE DIAGNOSES  Principal Problem (Resolved):    Upper GI bleed POA: Unknown  Active Problems:    * No active hospital problems. *  Resolved Problems:    Elevated BUN POA: Unknown    Acute blood loss anemia POA: Unknown    Elevated troponin POA: Unknown    COVID-19 POA: Unknown  Pandiverticulosis  Small internal hemorrhoids  Erosive distal gastritis  Large hiatal hernia  Tight Schatzki's ring, mild therapeutic dilatation using scope itself.    FOLLOW UP  No future appointments.  [facility]  [address]  [place] [id number] [phone]    The office will contact you to set up your home visit. If you do not hear from them please call to schedule.Thank you       MEDICATIONS ON DISCHARGE     Medication List      START taking these medications      Instructions   omeprazole 40 MG delayed-release capsule  Commonly known as: PRILOSEC   Take 1 Cap by mouth 2 Times a Day. For 30 days, then once daily for 60 days.  Dose: 40 mg        CONTINUE taking these medications      Instructions   acetaminophen 325 MG Tabs  Commonly known as: Tylenol   Take 2 Tabs by mouth every 6 hours as needed for Fever or Moderate Pain (Temp >101.5F).  Dose: 650 mg     ascorbic acid 500 MG tablet  Commonly known as: VITAMIN C   Take 1 Tab by mouth every day.  Dose: 500 mg     calcium carbonate 500 MG Tabs  Commonly known as: OS-FOREST 500   Take 2 Tabs by mouth 2 times a day, with meals.  Dose: 1,000 mg     ibuprofen 200 MG Tabs  Commonly known as: MOTRIN   Take 600 mg by mouth 2 times a day as needed. Indications: Pain  Dose: 600 mg     multivitamin Tabs   Take 1 Tab by mouth  every day.  Dose: 1 Tab     NEOMYCIN-POLYMYXIN-HC (OTIC) 1 % Soln  Commonly known as: Cortisporin   Place 4 Drops in ear 3 times a day.  Dose: 4 Drop            Allergies  No Known Allergies    DIET  Orders Placed This Encounter   Procedures   • Diet Order Diet: Cardiac     Standing Status:   Standing     Number of Occurrences:   1     Order Specific Question:   Diet:     Answer:   Cardiac [6]       ACTIVITY  As tolerated.  Weight bearing as tolerated    CONSULTATIONS  GI    PROCEDURES  EGD  COLONOSCOPY    LABORATORY  Lab Results   Component Value Date    SODIUM 142 12/17/2020    POTASSIUM 3.1 (L) 12/17/2020    CHLORIDE 109 12/17/2020    CO2 23 12/17/2020    GLUCOSE 72 12/17/2020    BUN 17 12/17/2020    CREATININE 0.65 12/17/2020        Lab Results   Component Value Date    WBC 6.9 12/17/2020    HEMOGLOBIN 8.7 (L) 12/17/2020    HEMATOCRIT 26.4 (L) 12/17/2020    PLATELETCT 196 12/17/2020        Total time of the discharge process exceeds 39 minutes.

## 2020-12-17 NOTE — DISCHARGE PLANNING
Received Choice form at [phone]  Agency/Facility Name: [facility]  Referral sent per Choice form @ [phone]

## 2020-12-17 NOTE — FACE TO FACE
Face to Face Supporting Documentation - Home Health    The encounter with this patient was in whole or in part the primary reason for home health admission.    Date of encounter:   Patient:                    MRN:                       YOB: 2020  Sherri Balbuena  [id number]  5/4/1928     Home health to see patient for:  Skilled Nursing care for assessment, interventions & education    Skilled need for:  Surgical Aftercare Recent EGD/Colonoscopy.     Skilled nursing interventions to include:  Comment: Please draw CBC 12/19/2020    Homebound status evidenced by:  Needs the assistance of another person in order to leave the home. Leaving home requires a considerable and taxing effort. There is a normal inability to leave the home.    Community Physician to provide follow up care: Pcp Pt States None     Optional Interventions? Yes, Details: Per HHRN.       I certify the face to face encounter for this home health care referral meets the CMS requirements and the encounter/clinical assessment with the patient was, in whole, or in part, for the medical condition(s) listed above, which is the primary reason for home health care. Based on my clinical findings: the service(s) are medically necessary, support the need for home health care, and the homebound criteria are met.  I certify that this patient has had a face to face encounter by myself.  Kenneth Alatorre M.D. - NPI: [id number]

## 2020-12-17 NOTE — OP REPORT
DATE OF SERVICE:  12/16/2020     INDICATION FOR THE PROCEDURE:  GI bleeding and anemia with unrevealing upper   endoscopy yesterday.     CONSENT:  Informed consent was obtained directly from the patient after   benefits, risks, and possible alternatives were discussed.     MEDICATIONS:  The patient received a propofol-based regimen from Dr. Gipson   from anesthesia.  Please see her notes for full details.     PROCEDURE DESCRIPTION:  The patient was placed in the left lateral decubitus   position and provided with supplemental oxygen via face mask.  When ready, a   digital rectal examination was performed which was normal.  The colonoscope   was then inserted into the rectum and advanced carefully in the usual manner   to the cecum.     FINDINGS:  The colon had multiple small to large diverticula present   throughout the entire course of the colon.  These were resected, and no   obvious stigmata of recent bleeding was identified.  Otherwise, the colon   appeared normal without polyps, inflammatory change, or other abnormalities.    Small internal hemorrhoids were noted.  The scope was then withdrawn after   excess air was removed from the colonic lumen.     COMPLICATIONS:  No complications during or in the immediate postoperative   period.     IMPRESSION:   1.  Pandiverticulosis.  2.  Tortuous colon.  3.  Small internal hemorrhoids.     RECOMMENDATIONS:  The patient may be considered for discharge today as she has   not had any further bleeding.  I will arrange outpatient capsule endoscopy to   rule out the possibility of small intestinal bleeding source, though I do   have suspicion for a diverticular bleed causing the patient's presentation   despite being unable to locate a specific diverticular bleeding source today.    I will follow up with her in the office after her capsule endoscopy is   completed.        ______________________________  CASTRO CORREA MD    WP/JOSE ANTONIO    DD:  12/16/2020 11:14  DT:  12/16/2020  12:18    Job#:  [id number]

## 2020-12-17 NOTE — DISCHARGE PLANNING
ATTN: Case Management  RE: Referral for Home Health    As of 12/17/2020, we have accepted the Home Health referral for the patient listed above.    A [facility] clinician will be out to see the patient within 48 hours. If you have any questions or concerns regarding the patient’s transition to Home Health, please do not hesitate to contact us at [phone].      We look forward to collaborating with you,  [facility] Home Health Team

## 2020-12-18 NOTE — DISCHARGE INSTRUCTIONS
Discharge Instructions    Discharged to home by car with Daughter. Discharged via wheelchair, hospital escort: Yes.  Special equipment needed: Not Applicable    Be sure to schedule a follow-up appointment with your primary care doctor or any specialists as instructed.     Discharge Plan:   Diet Plan: Discussed  Activity Level: Discussed  Confirmed Follow up Appointment: Patient to Call and Schedule Appointment  Confirmed Symptoms Management: Discussed  Medication Reconciliation Updated: Yes  Influenza Vaccine Indication: Patient Refuses    I understand that a diet low in cholesterol, fat, and sodium is recommended for good health. Unless I have been given specific instructions below for another diet, I accept this instruction as my diet prescription.   Other diet: Cardiac diet as tolerated    Special Instructions:   Do not take medicines that contain aspirin, ibuprofen, advil, aleve or naproxen.  These medicines can cause ulcers and bleeding in the stomach and intestines. Ulcers and bleeding can happen without warning symptoms and can cause death.   To reduce your risk of bleeding, do not smoke cigarettes or drink alcohol.   Return to the ER if you are feeling weak, dizzy or lightheaded      Gastrointestinal Bleeding  Gastrointestinal (GI) bleeding is bleeding somewhere along the path that food travels through the body (digestive tract). This path is anywhere between the mouth and the opening of the butt (anus). You may have blood in your poop (stool) or have black poop. If you throw up (vomit), there may be blood in it.  This condition can be mild, serious, or even life-threatening. If you have a lot of bleeding, you may need to stay in the hospital.  What are the causes?  This condition may be caused by:  · Irritation and swelling of the esophagus (esophagitis). The esophagus is part of the body that moves food from your mouth to your stomach.  · Swollen veins in the butt (hemorrhoids).  · Areas of painful tearing  in the opening of the butt (anal fissures). These are often caused by passing hard poop.  · Pouches that form on the colon over time (diverticulosis).  · Irritation and swelling (diverticulitis) in areas where pouches have formed on the colon.  · Growths (polyps) or cancer. Colon cancer often starts out as growths that are not cancer.  · Irritation of the stomach lining (gastritis).  · Sores (ulcers) in the stomach.  What increases the risk?  You are more likely to develop this condition if you:  · Have a certain type of infection in your stomach (Helicobacter pylori infection).  · Take certain medicines.  · Smoke.  · Drink alcohol.  What are the signs or symptoms?  Common symptoms of this condition include:  · Throwing up (vomiting) material that has bright red blood in it. It may look like coffee grounds.  · Changes in your poop. The poop may:  ? Have red blood in it.  ? Be black, look like tar, and smell stronger than normal.  ? Be red.  · Pain or cramping in the belly (abdomen).  How is this treated?  Treatment for this condition depends on the cause of the bleeding. For example:  · Sometimes, the bleeding can be stopped during a procedure that is done to find the problem (endoscopy or colonoscopy).  · Medicines can be used to:  ? Help control irritation, swelling, or infection.  ? Reduce acid in your stomach.  · Certain problems can be treated with:  ? Creams.  ? Medicines that are put in the butt (suppositories).  ? Warm baths.  · Surgery is sometimes needed.  · If you lose a lot of blood, you may need a blood transfusion.  If bleeding is mild, you may be allowed to go home. If there is a lot of bleeding, you will need to stay in the hospital.  Follow these instructions at home:    · Take over-the-counter and prescription medicines only as told by your doctor.  · Eat foods that have a lot of fiber in them. These foods include beans, whole grains, and fresh fruits and vegetables. You can also try eating 1-3  prunes each day.  · Drink enough fluid to keep your pee (urine) pale yellow.  · Keep all follow-up visits as told by your doctor. This is important.  Contact a doctor if:  · Your symptoms do not get better.  Get help right away if:  · Your bleeding does not stop.  · You feel dizzy or you pass out (faint).  · You feel weak.  · You have very bad cramps in your back or belly.  · You pass large clumps of blood (clots) in your poop.  · Your symptoms are getting worse.  · You have chest pain or fast heartbeats.  Summary  · GI bleeding is bleeding somewhere along the path that food travels through the body (digestive tract).  · This bleeding can be caused by many things. Treatment depends on the cause of the bleeding.  · Take medicines only as told by your doctor.  · Keep all follow-up visits as told by your doctor. This is important.  This information is not intended to replace advice given to you by your health care provider. Make sure you discuss any questions you have with your health care provider.  Document Released: 09/26/2009 Document Revised: 07/31/2019 Document Reviewed: 07/31/2019  [employer] Patient Education © 2020 [employer]    Anemia, Frequently Asked Questions  WHAT ARE THE SYMPTOMS OF ANEMIA?  · Headache.   · Difficulty thinking.   · Fatigue.   · Shortness of breath.   · Weakness.   · Rapid heartbeat.   AT WHAT POINT ARE PEOPLE CONSIDERED ANEMIC?   This varies with gender and age.   · Both hemoglobin (Hgb) and hematocrit values are used to define anemia. These lab values are obtained from a complete blood count (CBC) test. This is performed at a caregiver's office.   · The normal range of hemoglobin values for adult men is 14.0 g/dL to 17.4 g/dL. For nonpregnant women, values are 12.3 g/dL to 15.3 g/dL.   · The World Health Organization defines anemia as less than 12 g/dL for nonpregnant women and less than 13 g/dL for men.   · For adult males, the average normal hematocrit is 46%, and the range is 40% to  52%.   · For adult females, the average normal hematocrit is 41%, and the range is 35% to 47%.   · Values that fall below the lower limits can be a sign of anemia and should have further checking (evaluation).   GROUPS OF PEOPLE WHO ARE AT RISK FOR DEVELOPING ANEMIA INCLUDE:   · Infants who are  or taking a formula that is not fortified with iron.   · Children going through a rapid growth spurt. The iron available can not keep up with the needs for a red cell mass which must grow with the child.   · Women in childbearing years. They need iron because of blood loss during menstruation.   · Pregnant women. The growing fetus creates a high demand for iron.   · People with ongoing gastrointestinal blood loss are at risk of developing iron deficiency.   · Individuals with leukemia or cancer who must receive chemotherapy or radiation to treat their disease. The drugs or radiation used to treat these diseases often decreases the bone marrow's ability to make cells of all classes. This includes red blood cells, white blood cells, and platelets.   · Individuals with chronic inflammatory conditions such as rheumatoid arthritis or chronic infections.   · The elderly.   ARE SOME TYPES OF ANEMIA INHERITED?   · Yes, some types of anemia are due to inherited or genetic defects.   · Sickle cell anemia. This occurs most often in people of , , and Mediterranean descent.   · Thalassemia (or Beltran's anemia). This type is found in people of Mediterranean and Southeast  descent. These types of anemia are common.   · Fanconi. This is rare.   CAN CERTAIN MEDICATIONS CAUSE A PERSON TO BECOME ANEMIC?   Yes. For example, drugs to fight cancer (chemotherapeutic agents) often cause anemia. These drugs can slow the bone marrow's ability to make red blood cells. If there are not enough red blood cells, the body does not get enough oxygen.  WHAT HEMATOCRIT LEVEL IS REQUIRED TO DONATE BLOOD?   The lower limit  of an acceptable hematocrit for blood donors is 38%. If you have a low hematocrit value, you should schedule an appointment with your caregiver.  ARE BLOOD TRANSFUSIONS COMMONLY USED TO CORRECT ANEMIA, AND ARE THEY DANGEROUS?   They are used to treat anemia as a last resort. Your caregiver will find the cause of the anemia and correct it if possible. Most blood transfusions are given because of excessive bleeding at the time of surgery, with trauma, or because of bone marrow suppression in patients with cancer or leukemia on chemotherapy. Blood transfusions are safer than ever before. We also know that blood transfusions affect the immune system and may increase certain risks. There is also a concern for human error. In 1/16,000 transfusions, a patient receives a transfusion of blood that is not matched with his or her blood type.   WHAT IS IRON DEFICIENCY ANEMIA AND CAN I CORRECT IT BY CHANGING MY DIET?   Iron is an essential part of hemoglobin. Without enough hemoglobin, anemia develops and the body does not get the right amount of oxygen. Iron deficiency anemia develops after the body has had a low level of iron for a long time. This is either caused by blood loss, not taking in or absorbing enough iron, or increased demands for iron (like pregnancy or rapid growth).   Foods from animal origin such as beef, chicken, and pork, are good sources of iron. Be sure to have one of these foods at each meal. Vitamin C helps your body absorb iron. Foods rich in Vitamin C include citrus, bell pepper, strawberries, spinach and cantaloupe. In some cases, iron supplements may be needed in order to correct the iron deficiency. In the case of poor absorption, extra iron may have to be given directly into the vein through a needle (intravenously).  I HAVE BEEN DIAGNOSED WITH IRON DEFICIENCY ANEMIA AND MY CAREGIVER PRESCRIBED IRON SUPPLEMENTS. HOW LONG WILL IT TAKE FOR MY BLOOD TO BECOME NORMAL?   It depends on the degree of  anemia at the beginning of treatment. Most people with mild to moderate iron deficiency, anemia will correct the anemia over a period of 2 to 3 months. But after the anemia is corrected, the iron stored by the body is still low. Caregivers often suggest an additional 6 months of oral iron therapy once the anemia has been reversed. This will help prevent the iron deficiency anemia from quickly happening again. Non-anemic adult males should take iron supplements only under the direction of a doctor, too much iron can cause liver damage.   MY HEMOGLOBIN IS 9 G/DL AND I AM SCHEDULED FOR SURGERY. SHOULD I POSTPONE THE SURGERY?   If you have Hgb of 9, you should discuss this with your caregiver right away. Many patients with similar hemoglobin levels have had surgery without problems. If minimal blood loss is expected for a minor procedure, no treatment may be necessary.   If a greater blood loss is expected for more extensive procedures, you should ask your caregiver about being treated with erythropoietin and iron. This is to accelerate the recovery of your hemoglobin to a normal level before surgery. An anemic patient who undergoes high-blood-loss surgery has a greater risk of surgical complications and need for a blood transfusion, which also carries some risk.     Document Released: 07/26/2005 Document Revised: 03/11/2013 Document Reviewed: 01/10/2012  [employer]® Patient Information ©2013 [employer].

## 2020-12-18 NOTE — PROGRESS NOTES
Discharging Patient home per physician order.  Discharged with Daughter.  Demonstrated understanding of discharge instructions, follow up appointments, home medications, prescriptions, and nursing care instructions.  Ambulating with one assist and FWW, voiding without difficulty, pain well controlled, tolerating oral medications, oxygen saturation greater than 90%, tolerating diet.  Educational handouts given and discussed.  Verbalized understanding of discharge instructions and educational handouts.  All questions answered.  Belongings with patient at time of discharge.

## 2020-12-30 NOTE — DOCUMENTATION QUERY
[facility]                                                                       Query Response Note      PATIENT:               RJ TEJADA  ACCT #:                  [id number]  MRN:                     [id number]  :                      [id number]  ADMIT DATE:       2020 12:37 PM  DISCH DATE:        2020 4:53 PM  RESPONDING  PROVIDER #:        [id number]           QUERY TEXT:    Patient has COVID-19 documented in the H&P and subsequent notes. SARS-CoV-2 by PCR: Not Detected lab result is noted. Only confirmed or presumptive (confirmed local or state lab findings) COVID-19 diagnoses can be reported as such. Please clarify the status of this condition.    The patient's clinical indicators include:  H&P: COVID-19, x-ray w/ covid-19, patient asymptomatic   CXR: Patchy ill-defined bilateral parenchymal opacities seen in clinical setting of Covid pneumonia.   SARS-CoV-2 by PCR: Not Detected  Treatment: Oximetry; lab/imaging  Risk Factors: COVID-19 pandemic    Thank You,  Jeanette Quiroz RN  Clinical    Connect via [employer] Messenger  Options provided:   -- COVID19 is ruled in, (Please specify evidence)   -- COVID19 is ruled out   -- Unable to determine      Query created by: Jeanette Quiroz on 2020 2:55 PM    RESPONSE TEXT:    COVID19 is ruled out          Electronically signed by:  MAXIMO DIAS MD 2020 4:25 PM

## 2021-01-11 DIAGNOSIS — Z23 NEED FOR VACCINATION: ICD-10-CM

## 2021-07-26 ENCOUNTER — PATIENT OUTREACH (OUTPATIENT)
Dept: HEALTH INFORMATION MANAGEMENT | Facility: OTHER | Age: 86
End: 2021-07-26

## 2021-07-26 NOTE — NON-PROVIDER
Member called in to establish care with a PCP. Verified HIPAA. I scheduled the appointment and provide all needed information. I also update in [employer]. Member also declined scheduling MARICEL. Used Epic and [employer].

## 2021-08-12 ENCOUNTER — OFFICE VISIT (OUTPATIENT)
Dept: MEDICAL GROUP | Facility: PHYSICIAN GROUP | Age: 86
End: 2021-08-12
Payer: MEDICARE

## 2021-08-12 ENCOUNTER — HOSPITAL ENCOUNTER (OUTPATIENT)
Facility: MEDICAL CENTER | Age: 86
End: 2021-08-12
Attending: FAMILY MEDICINE
Payer: MEDICARE

## 2021-08-12 VITALS
WEIGHT: 95 LBS | BODY MASS INDEX: 18.65 KG/M2 | HEIGHT: 60 IN | DIASTOLIC BLOOD PRESSURE: 10 MMHG | HEART RATE: 86 BPM | OXYGEN SATURATION: 97 % | SYSTOLIC BLOOD PRESSURE: 180 MMHG | RESPIRATION RATE: 15 BRPM | TEMPERATURE: 99.5 F

## 2021-08-12 DIAGNOSIS — Z00.00 HEALTHCARE MAINTENANCE: ICD-10-CM

## 2021-08-12 DIAGNOSIS — D62 ANEMIA ASSOCIATED WITH ACUTE BLOOD LOSS: ICD-10-CM

## 2021-08-12 DIAGNOSIS — J45.909 UNCOMPLICATED ASTHMA, UNSPECIFIED ASTHMA SEVERITY, UNSPECIFIED WHETHER PERSISTENT: ICD-10-CM

## 2021-08-12 DIAGNOSIS — I35.8 HEART MURMUR, AORTIC: ICD-10-CM

## 2021-08-12 LAB
ALBUMIN SERPL BCP-MCNC: 4.2 G/DL (ref 3.2–4.9)
ALBUMIN/GLOB SERPL: 1.4 G/DL
ALP SERPL-CCNC: 67 U/L (ref 30–99)
ALT SERPL-CCNC: 8 U/L (ref 2–50)
ANION GAP SERPL CALC-SCNC: 14 MMOL/L (ref 7–16)
ANISOCYTOSIS BLD QL SMEAR: ABNORMAL
AST SERPL-CCNC: 21 U/L (ref 12–45)
BASOPHILS # BLD AUTO: 1.4 % (ref 0–1.8)
BASOPHILS # BLD: 0.06 K/UL (ref 0–0.12)
BILIRUB SERPL-MCNC: 0.2 MG/DL (ref 0.1–1.5)
BUN SERPL-MCNC: 19 MG/DL (ref 8–22)
CALCIUM SERPL-MCNC: 9.8 MG/DL (ref 8.5–10.5)
CHLORIDE SERPL-SCNC: 106 MMOL/L (ref 96–112)
CO2 SERPL-SCNC: 23 MMOL/L (ref 20–33)
COMMENT 1642: NORMAL
CREAT SERPL-MCNC: 0.7 MG/DL (ref 0.5–1.4)
EOSINOPHIL # BLD AUTO: 0.15 K/UL (ref 0–0.51)
EOSINOPHIL NFR BLD: 3.4 % (ref 0–6.9)
ERYTHROCYTE [DISTWIDTH] IN BLOOD BY AUTOMATED COUNT: 53 FL (ref 35.9–50)
GLOBULIN SER CALC-MCNC: 3 G/DL (ref 1.9–3.5)
GLUCOSE SERPL-MCNC: 89 MG/DL (ref 65–99)
HCT VFR BLD AUTO: 25.8 % (ref 37–47)
HGB BLD-MCNC: 7.1 G/DL (ref 12–16)
HYPOCHROMIA BLD QL SMEAR: ABNORMAL
IMM GRANULOCYTES # BLD AUTO: 0.01 K/UL (ref 0–0.11)
IMM GRANULOCYTES NFR BLD AUTO: 0.2 % (ref 0–0.9)
LYMPHOCYTES # BLD AUTO: 1.28 K/UL (ref 1–4.8)
LYMPHOCYTES NFR BLD: 28.8 % (ref 22–41)
MCH RBC QN AUTO: 20.5 PG (ref 27–33)
MCHC RBC AUTO-ENTMCNC: 27.5 G/DL (ref 33.6–35)
MCV RBC AUTO: 74.6 FL (ref 81.4–97.8)
MICROCYTES BLD QL SMEAR: ABNORMAL
MONOCYTES # BLD AUTO: 0.41 K/UL (ref 0–0.85)
MONOCYTES NFR BLD AUTO: 9.2 % (ref 0–13.4)
MORPHOLOGY BLD-IMP: NORMAL
NEUTROPHILS # BLD AUTO: 2.53 K/UL (ref 2–7.15)
NEUTROPHILS NFR BLD: 57 % (ref 44–72)
NRBC # BLD AUTO: 0 K/UL
NRBC BLD-RTO: 0 /100 WBC
PLATELET # BLD AUTO: 323 K/UL (ref 164–446)
PLATELET BLD QL SMEAR: NORMAL
PMV BLD AUTO: 11.1 FL (ref 9–12.9)
POLYCHROMASIA BLD QL SMEAR: NORMAL
POTASSIUM SERPL-SCNC: 4.8 MMOL/L (ref 3.6–5.5)
PROT SERPL-MCNC: 7.2 G/DL (ref 6–8.2)
RBC # BLD AUTO: 3.46 M/UL (ref 4.2–5.4)
RBC BLD AUTO: PRESENT
SODIUM SERPL-SCNC: 143 MMOL/L (ref 135–145)
WBC # BLD AUTO: 4.4 K/UL (ref 4.8–10.8)

## 2021-08-12 PROCEDURE — 36415 COLL VENOUS BLD VENIPUNCTURE: CPT | Performed by: FAMILY MEDICINE

## 2021-08-12 PROCEDURE — 80053 COMPREHEN METABOLIC PANEL: CPT

## 2021-08-12 PROCEDURE — 99204 OFFICE O/P NEW MOD 45 MIN: CPT | Performed by: FAMILY MEDICINE

## 2021-08-12 PROCEDURE — 85025 COMPLETE CBC W/AUTO DIFF WBC: CPT

## 2021-08-12 PROCEDURE — 99000 SPECIMEN HANDLING OFFICE-LAB: CPT | Performed by: FAMILY MEDICINE

## 2021-08-12 RX ORDER — ALBUTEROL SULFATE 90 UG/1
2 AEROSOL, METERED RESPIRATORY (INHALATION) EVERY 4 HOURS PRN
Qty: 1 EACH | Refills: 1 | Status: SHIPPED | OUTPATIENT
Start: 2021-08-12 | End: 2021-09-01 | Stop reason: ALTCHOICE

## 2021-08-12 ASSESSMENT — ENCOUNTER SYMPTOMS
LOSS OF CONSCIOUSNESS: 0
GASTROINTESTINAL NEGATIVE: 1
COUGH: 0
WHEEZING: 0
SHORTNESS OF BREATH: 0
HEADACHES: 0
CHILLS: 0
DIZZINESS: 0
WEAKNESS: 0
PALPITATIONS: 0
PSYCHIATRIC NEGATIVE: 1
ORTHOPNEA: 0
CLAUDICATION: 0
WEIGHT LOSS: 0
FEVER: 0
NEUROLOGICAL NEGATIVE: 1
TINGLING: 0
MUSCULOSKELETAL NEGATIVE: 1

## 2021-08-12 ASSESSMENT — FIBROSIS 4 INDEX: FIB4 SCORE: 2.85

## 2021-08-12 ASSESSMENT — PATIENT HEALTH QUESTIONNAIRE - PHQ9: CLINICAL INTERPRETATION OF PHQ2 SCORE: 0

## 2021-08-12 NOTE — PROGRESS NOTES
Subjective:     CC:   Chief Complaint   Patient presents with   • Establish Care       HISTORY OF THE PRESENT ILLNESS: Patient is a 89 y.o. female. This pleasant patient is here today to establish care . Her prior PCP was at [facility]. BP abnormal in clinic today, readings done in both arms, cuff is right size for patient are readings were 190/10 & 180/10 bilaterally, pt declining interventions.     Heart murmur, aortic  On examination, heart sounds auscultated and heart murmur appreciated over cardiac apex, grade 4. Pt denies having hx of heart murmur, denies chest pain, shortness of breath, or fatigue with activity. Patient also had wide pulse pressure today /10, pt states that she feels fine and is asymptomatic. Denies history of hypertension. States that she does not want any interventions or medications and declined an Echo or Cardiology referral. POLST given to patient to discuss with her family and bring back to next visit.     Anemia associated with acute blood loss  Pt has hx of anemia. In December 2020 she required a blood transfusion. Pt denies feeling short of breath or paraesthesias but is agreeable to do CBC w diff today in clinic to recheck blood counts.       Current Outpatient Medications Ordered in Epic   Medication Sig Dispense Refill   • albuterol 108 (90 Base) MCG/ACT Aero Soln inhalation aerosol Inhale 2 Puffs every four hours as needed for Shortness of Breath. 1 Each 1     No current [facility]-ordered facility-administered medications on file.       Health Maintenance: Completed    Review of Systems   Constitutional: Negative for chills, fever, malaise/fatigue and weight loss.   HENT: Negative for ear pain and hearing loss.    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    Cardiovascular: Negative for chest pain, palpitations, orthopnea, claudication and leg swelling.   Gastrointestinal: Negative.   "  Genitourinary: Negative.    Musculoskeletal: Negative.    Neurological: Negative.  Negative for dizziness, tingling, loss of consciousness, weakness and headaches.   Psychiatric/Behavioral: Negative.          Objective:     Exam: BP (!) 180/10 (BP Location: Right arm, Patient Position: Sitting, BP Cuff Size: Adult)   Pulse 86   Temp 37.5 °C (99.5 °F) (Temporal)   Resp 15   Ht 1.53 m (5' 0.24\")   Wt 43.1 kg (95 lb)   SpO2 97%  Body mass index is 18.41 kg/m².    Physical Exam  Vitals reviewed.   Constitutional:       Appearance: Normal appearance.   HENT:      Head: Normocephalic.      Right Ear: External ear normal. There is impacted cerumen.      Left Ear: External ear normal. There is impacted cerumen.      Mouth/Throat:      Mouth: Mucous membranes are moist.      Pharynx: Oropharynx is clear.   Eyes:      Conjunctiva/sclera: Conjunctivae normal.      Pupils: Pupils are equal, round, and reactive to light.   Cardiovascular:      Rate and Rhythm: Normal rate and regular rhythm.      Pulses: Normal pulses.      Heart sounds: Murmur heard.   Crescendo decrescendo systolic murmur is present with a grade of 4/6.     Pulmonary:      Effort: Pulmonary effort is normal. No respiratory distress.      Breath sounds: Normal breath sounds. No wheezing or rhonchi.   Abdominal:      General: Abdomen is flat. Bowel sounds are normal. There is no distension.      Tenderness: There is no abdominal tenderness.   Musculoskeletal:      Right lower leg: No edema.      Left lower leg: No edema.   Skin:     General: Skin is warm and dry.   Neurological:      General: No focal deficit present.      Mental Status: She is alert and oriented to person, place, and time.   Psychiatric:         Mood and Affect: Mood normal.         Behavior: Behavior normal.          A chaperone was offered to the patient during today's exam. Chaperone name: daughter in law was present.      Assessment & Plan:   89 y.o. female with the following " -    1. Uncomplicated asthma, unspecified asthma severity, unspecified whether persistent  Chronic, states she has had asthma since she was 7, no flare ups or need for medication. Rescue inhaler ordered for PRN in case of shortness of breath, smoke is heavy in the area and pt would like to have one as needed.   - albuterol 108 (90 Base) MCG/ACT Aero Soln inhalation aerosol; Inhale 2 Puffs every four hours as needed for Shortness of Breath.  Dispense: 1 Each; Refill: 1    2. Healthcare maintenance  Pt declined all testing/vaccines. Pt does not want to take medications or have interventions for health, although states she would do blood transfusion if it were needed. POLST form given and will complete at next visit once patient has a chance to read it with family.   - Comp Metabolic Panel; Future    3. Heart murmur, aortic  Systolic murmur, grade 4 heard today on exam. Pt declined any interventions at this time. POLST form given today and will discuss at f/u in 2 weeks.     4. Anemia associated with acute blood loss   Chronic, exam done in December 2020 which did not find where blood loss was coming from. Transfusion given at that time, will repeat CBC today  - CBC WITH DIFFERENTIAL; Future    I spent a total of 55 minutes with record review, exam, communication with the patient, communication with other providers, and documentation of this encounter.    Return in about 18 days (around 8/30/2021) for flush ears and do POLST , F/U BP.    Please note that this dictation was created using voice recognition software. I have made every reasonable attempt to correct obvious errors, but I expect that there are errors of grammar and possibly content that I did not discover before finalizing the note.

## 2021-08-12 NOTE — LETTER
[facility]  KARLENE Mcclellan.  [address]  [place] [address]  Fax: [phone]   Authorization for Release/Disclosure of   Protected Health Information   Name: SHERRI TEJADA : 1928 SSN: [id number]   Address: [address] [place] [address] [address]  [place] [address] Phone:    [phone] (home)    I authorize the entity listed below to release/disclose the PHI below to:   [employer] [facility]/FLAKITO Mcclellan and FLAKITO Mcclellan   Provider or Entity Name:  [facility]   Address   City, State, Zip   Phone:      Fax:     Reason for request: continuity of care   Information to be released:    [  ] LAST COLONOSCOPY,  including any PATH REPORT and follow-up  [  ] LAST FIT/COLOGUARD RESULT [  ] LAST DEXA  [  ] LAST MAMMOGRAM  [  ] LAST PAP  [  ] LAST LABS [  ] RETINA EXAM REPORT  [  ] IMMUNIZATION RECORDS  [ XXX ] Release all info      [ XXX ] Check here and initial the line next to each item to release ALL health information INCLUDING  _____ Care and treatment for drug and / or alcohol abuse  _____ HIV testing, infection status, or AIDS  _____ Genetic Testing    DATES OF SERVICE OR TIME PERIOD TO BE DISCLOSED: _____________  I understand and acknowledge that:  * This Authorization may be revoked at any time by you in writing, except if your health information has already been used or disclosed.  * Your health information that will be used or disclosed as a result of you signing this authorization could be re-disclosed by the recipient. If this occurs, your re-disclosed health information may no longer be protected by State or Federal laws.  * You may refuse to sign this Authorization. Your refusal will not affect your ability to obtain treatment.  * This Authorization becomes effective upon signing and will  on (date) __________.      If no date is indicated, this Authorization will  one (1) year from the signature date.    Name: Sherri PORRAS  Esha    Signature:   Date:     8/12/2021       PLEASE FAX REQUESTED RECORDS BACK TO: [phone]

## 2021-08-12 NOTE — ASSESSMENT & PLAN NOTE
Pt has hx of anemia. In December 2020 she required a blood transfusion. Pt denies feeling short of breath or paraesthesias but is agreeable to do CBC w diff today in clinic to recheck blood counts.

## 2021-08-12 NOTE — PROGRESS NOTES
Pt was seated, confirmed pt name and , procedure explained to pt, venipuncture performed in LAC using aseptic technique, 1 gold, 1 lav. tube collected, gauze placed with pressure on venipuncture site until hemostasis observed, site clean and dry, no redness or swelling observed, bandage placed, pt tolerated well and voiced no concerns.

## 2021-08-12 NOTE — ASSESSMENT & PLAN NOTE
On examination, heart sounds auscultated and heart murmur appreciated over cardiac apex, grade 4. Pt denies having hx of heart murmur, denies chest pain, shortness of breath, or fatigue with activity. Patient also had wide pulse pressure today /10, pt states that she feels fine and is asymptomatic. Denies history of hypertension. States that she does not want any interventions or medications and declined an Echo or Cardiology referral. POLST given to patient to discuss with her family and bring back to next visit.

## 2021-08-16 ENCOUNTER — TELEPHONE (OUTPATIENT)
Dept: MEDICAL GROUP | Facility: PHYSICIAN GROUP | Age: 86
End: 2021-08-16

## 2021-08-16 NOTE — TELEPHONE ENCOUNTER
----- Message from FLAKITO Mcclellan sent at 8/13/2021  3:11 PM PDT -----  Hello, can someone please call this patient and let her know she is very anemic still, her hemoglobin is down to 7.1, if she is feeling dizzy, lethargic, short of breath to go to the ER over the weekend as she may need transfused but if she is still asymptomatic we will repeat her CBC in 2 weeks when she is in clinic to make sure it is not worsening. Thank you,  Rosalia SHAFFER

## 2021-08-16 NOTE — TELEPHONE ENCOUNTER
Phone Number Called: [phone] (home)   Sherri Balbuena    Call outcome: Spoke to patient regarding message below.    Message: Informed pt. Pt denies any symptoms. Would like a flowsheet (sent to pt). Pt also stated she is picking up a couple rx from pharmacy today as well and will be at her appointment on 8/30 to go over results.

## 2021-08-27 ENCOUNTER — TELEPHONE (OUTPATIENT)
Dept: MEDICAL GROUP | Facility: PHYSICIAN GROUP | Age: 86
End: 2021-08-27

## 2021-08-27 NOTE — TELEPHONE ENCOUNTER
ESTABLISHED PATIENT PRE-VISIT PLANNING     Patient was NOT contacted to complete PVP.     Note: Patient will not be contacted if there is no indication to call.     1.  Reviewed notes from the last few office visits within the medical group: Yes    2.  If any orders were placed at last visit or intended to be done for this visit (i.e. 6 mos follow-up), do we have Results/Consult Notes?         •  Labs - Labs ordered, completed on 08/12/2021 and results are in chart.  Note: If patient appointment is for lab review and patient did not complete labs, check with provider if OK to reschedule patient until labs completed.       •  Imaging - Imaging was not ordered at last office visit.       •  Referrals - No referrals were ordered at last office visit.    3. Is this appointment scheduled as a Hospital Follow-Up? No    4.  Immunizations were updated in Epic using Reconcile Outside Information activity? Yes    5.  Patient is due for the following Health Maintenance Topics:   Health Maintenance Due   Topic Date Due   • Annual Wellness Visit  Never done   • COVID-19 Vaccine (1) Never done     6.  AHA (Pulse8) form printed for Provider? No, already completed

## 2021-08-30 ENCOUNTER — OFFICE VISIT (OUTPATIENT)
Dept: MEDICAL GROUP | Facility: PHYSICIAN GROUP | Age: 86
End: 2021-08-30
Payer: MEDICARE

## 2021-08-30 ENCOUNTER — HOSPITAL ENCOUNTER (OUTPATIENT)
Facility: MEDICAL CENTER | Age: 86
End: 2021-08-30
Attending: FAMILY MEDICINE
Payer: MEDICARE

## 2021-08-30 VITALS
WEIGHT: 96 LBS | BODY MASS INDEX: 18.85 KG/M2 | TEMPERATURE: 99 F | OXYGEN SATURATION: 88 % | SYSTOLIC BLOOD PRESSURE: 170 MMHG | RESPIRATION RATE: 16 BRPM | HEART RATE: 86 BPM | DIASTOLIC BLOOD PRESSURE: 10 MMHG | HEIGHT: 60 IN

## 2021-08-30 DIAGNOSIS — R09.89 WIDENED PULSE PRESSURE: ICD-10-CM

## 2021-08-30 DIAGNOSIS — H61.23 IMPACTED CERUMEN OF BOTH EARS: ICD-10-CM

## 2021-08-30 DIAGNOSIS — D62 ANEMIA ASSOCIATED WITH ACUTE BLOOD LOSS: ICD-10-CM

## 2021-08-30 DIAGNOSIS — Z78.9 POLST (PHYSICIAN ORDERS FOR LIFE-SUSTAINING TREATMENT): ICD-10-CM

## 2021-08-30 DIAGNOSIS — J45.909 UNCOMPLICATED ASTHMA, UNSPECIFIED ASTHMA SEVERITY, UNSPECIFIED WHETHER PERSISTENT: ICD-10-CM

## 2021-08-30 PROCEDURE — 36415 COLL VENOUS BLD VENIPUNCTURE: CPT | Performed by: FAMILY MEDICINE

## 2021-08-30 PROCEDURE — 83540 ASSAY OF IRON: CPT

## 2021-08-30 PROCEDURE — 99000 SPECIMEN HANDLING OFFICE-LAB: CPT | Performed by: FAMILY MEDICINE

## 2021-08-30 PROCEDURE — 99215 OFFICE O/P EST HI 40 MIN: CPT | Performed by: FAMILY MEDICINE

## 2021-08-30 PROCEDURE — 83550 IRON BINDING TEST: CPT

## 2021-08-30 PROCEDURE — 82728 ASSAY OF FERRITIN: CPT

## 2021-08-30 ASSESSMENT — ENCOUNTER SYMPTOMS
GASTROINTESTINAL NEGATIVE: 1
EYES NEGATIVE: 1
RESPIRATORY NEGATIVE: 1
PALPITATIONS: 0
MUSCULOSKELETAL NEGATIVE: 1
HEADACHES: 0
DIZZINESS: 0
FEVER: 0

## 2021-08-30 ASSESSMENT — FIBROSIS 4 INDEX: FIB4 SCORE: 2.14

## 2021-08-30 NOTE — ASSESSMENT & PLAN NOTE
8/30/21: Filled out per patients wishes and signed in clinic. Pt's  Daughter in law Toir present throughout discussion. Order scanned into media by CAROLINA Navarrete.

## 2021-08-30 NOTE — PROGRESS NOTES
Patient arrived for blood draw.   Verified patient's name/date-of-birth and reason for visit before procedure was started. Patient's left antecubital cleansed. Venipuncture attempts X1. Blood draw completed on patient's left AC. Applied pressure afterwards and placed Coban on site of venipuncture with directions for patient to remove within the next hour. Patient tolerated procedure well, no adverse reactions. Patient ambulated safely upon leaving clinic.   Completed labels were placed on blood samples in draw room with patient present. Appropriate blood samples were centrifuged. Blood samples were then placed in locked lab box for  pick-up.

## 2021-08-30 NOTE — ASSESSMENT & PLAN NOTE
"Patient's pulse pressure widened, patient reports she is unsure If this has been a problem in the past. Today her BP was 170/10, recheck the same. Her diastolic abnormally low, patient is asymptomatic. She does have murmur heard on examination, declines further work up such as echocardiogram or EKG. States she \"feels really good\" and she doesn't deem it necessary for further work up given she feels good and her age. POLST filled out today in clinic.   "

## 2021-08-30 NOTE — ASSESSMENT & PLAN NOTE
Patient given albuterol script for rescue inhaler on 8/12/21, states she does not have enough strength to use inhaler, she stated she felt short of breath last night and could not get it to work. She was able to calm herself down and breath through it, but states in the past she has had bad asthma attacks. Message sent to pharmacist Miguelina to see if there was a device that could assist patient in pushing inhaler down to get actuation, currently not in [place]. She does not have enough strength to open vials for nebulizer and needs inhaler to take with her when she is out in public in case of asthma flare up. There is an inhaler that is breath driven, will fill out non formulary request with [facility] for coverage of Respiclick Proair.

## 2021-08-30 NOTE — PROGRESS NOTES
"Subjective:     CC:   Chief Complaint   Patient presents with   • Lab Results   • Cerumen Impaction     Bilateral        HPI:   Sherri presents today for lab follow up and to fill out POLST. She is accompanied by her daughter in law Tori.     Anemia associated with acute blood loss  Last labs on 8/12/21  showing patient is anemic. Pt required a transfusion back in December due to acute blood loss. Pt denies blood in stool or urine. She does not want further work up to find cause of anemia, however she is open to transfusion if needed. She is currently asymptomatic, denies dizziness, shortness of breath, chest pains, heart palpitations. Will recheck CBC and total iron/ferritin.     WBC 4.8 - 10.8 K/uL 4.4 Low     RBC 4.20 - 5.40 M/uL 3.46 Low     Hemoglobin 12.0 - 16.0 g/dL 7.1 Low     Hematocrit 37.0 - 47.0 % 25.8 Low     MCV 81.4 - 97.8 fL 74.6 Low     MCH 27.0 - 33.0 pg 20.5 Low     MCHC 33.6 - 35.0 g/dL 27.5 Low     RDW 35.9 - 50.0 fL 53.0 High         Widened pulse pressure  Patient's pulse pressure widened, patient reports she is unsure If this has been a problem in the past. Today her BP was 170/10, recheck the same. Her diastolic abnormally low, patient is asymptomatic. She does have murmur heard on examination, declines further work up such as echocardiogram or EKG. States she \"feels really good\" and she doesn't deem it necessary for further work up given she feels good and her age. POLST filled out today in clinic.     POLST (Physician Orders for Life-Sustaining Treatment)  8/30/21: Filled out per patients wishes and signed in clinic. Pt's  Daughter in law Tori present throughout discussion. Order scanned into media by CAROLINA Navarrete.     Impacted cerumen of both ears  Procedure: Cerumen Removal  Risks and benefits of procedure discussed with patient.  Cerumen removed with  lavage   Patient tolerated the procedure well  Pt educated about proper care of ear canal. Q-tip cleaning discouraged, use of debrox and " warm water lavage discussed. Large amount removed from right ear, minimal amount from left ear. Pt will return in 2 months for another lavage as she was having left ear pain and MA unable to fully clear canal.       Uncomplicated asthma  Patient given albuterol script for rescue inhaler on 8/12/21, states she does not have enough strength to use inhaler, she stated she felt short of breath last night and could not get it to work. She was able to calm herself down and breath through it, but states in the past she has had bad asthma attacks. Message sent to pharmacist Miguelina to see if there was a device that could assist patient in pushing inhaler down to get actuation, [place] in [place]. She does not have enough strength to open vials for nebulizer and needs inhaler to take with her when she is out in public in case of asthma flare up. There is an inhaler that is breath driven, will fill out non formulary request with [facility] for coverage of Respiclick Proair.       Current Outpatient Medications Ordered in Epic   Medication Sig Dispense Refill   • albuterol 108 (90 Base) MCG/ACT Aero Soln inhalation aerosol Inhale 2 Puffs every four hours as needed for Shortness of Breath. 1 Each 1     No current [facility]-ordered facility-administered medications on file.       Health Maintenance: Completed    Review of Systems   Constitutional: Negative for fever and malaise/fatigue.   Eyes: Negative.    Respiratory: Negative.    Cardiovascular: Negative for chest pain, palpitations and leg swelling.   Gastrointestinal: Negative.    Genitourinary: Negative.    Musculoskeletal: Negative.    Neurological: Negative for dizziness and headaches.         Objective:     Exam:  BP (!) 170/10 (BP Location: Right arm, Patient Position: Sitting, BP Cuff Size: Adult)   Pulse 86   Temp 37.2 °C (99 °F) (Temporal)   Resp 16   Ht 1.524 m (5')   Wt 43.5 kg (96 lb)   SpO2 88%   BMI 18.75 kg/m²  Body mass index is 18.75 kg/m².    Physical  Exam  Vitals reviewed.   Constitutional:       Appearance: Normal appearance.   HENT:      Mouth/Throat:      Mouth: Mucous membranes are moist.      Pharynx: Oropharynx is clear.   Eyes:      Conjunctiva/sclera: Conjunctivae normal.      Pupils: Pupils are equal, round, and reactive to light.   Cardiovascular:      Rate and Rhythm: Normal rate and regular rhythm.      Pulses: Normal pulses.      Heart sounds: Murmur heard.     Pulmonary:      Effort: Pulmonary effort is normal.      Breath sounds: Normal breath sounds.   Skin:     General: Skin is warm and dry.   Neurological:      Mental Status: She is alert and oriented to person, place, and time.   Psychiatric:         Mood and Affect: Mood normal.          A chaperone was offered to the patient during today's exam. Chaperone name: daughter in law was present.      Assessment & Plan:     89 y.o. female with the following -     1. Anemia associated with acute blood loss  Chronic, source of blood loss unknown. Pt states she feels fine and is asymptomatic. Will recheck labs again today and if patient is willing start oral iron supplements, pt is willing to do transfusion if needed.   - CBC WITH DIFFERENTIAL; Future  - IRON/TOTAL IRON BIND; Future  - FERRITIN; Future    2. Widened pulse pressure  New problem observed last visit, continued to have widened pulse pressure today, diastolic low at 10 mmHg. Patient denies symptoms and declines further workup such as EKG and Echocardiogram. Will continue to monitor.     3. POLST (Physician Orders for Life-Sustaining Treatment)   Filled out in clinic today, scanned into media. Discussed with patient placing this somewhere in house that is easy to see for medical personnel. Pt verbalized understanding.     I spent a total of 40 minutes with record review, exam, communication with the patient, communication with other providers, and documentation of this encounter.      Return in about 5 months (around 1/30/2022) for F/U  labs.    Please note that this dictation was created using voice recognition software. I have made every reasonable attempt to correct obvious errors, but I expect that there are errors of grammar and possibly content that I did not discover before finalizing the note.

## 2021-08-30 NOTE — ASSESSMENT & PLAN NOTE
Procedure: Cerumen Removal  Risks and benefits of procedure discussed with patient.  Cerumen removed with  lavage   Patient tolerated the procedure well  Pt educated about proper care of ear canal. Q-tip cleaning discouraged, use of debrox and warm water lavage discussed. Large amount removed from right ear, minimal amount from left ear. Pt will return in 2 months for another lavage as she was having left ear pain and MA unable to fully clear canal.

## 2021-08-30 NOTE — ASSESSMENT & PLAN NOTE
Last labs on 8/12/21  showing patient is anemic. Pt required a transfusion back in December due to acute blood loss. Pt denies blood in stool or urine. She does not want further work up to find cause of anemia, however she is open to transfusion if needed. She is currently asymptomatic, denies dizziness, shortness of breath, chest pains, heart palpitations. Will recheck CBC and total iron/ferritin.     WBC 4.8 - 10.8 K/uL 4.4 Low     RBC 4.20 - 5.40 M/uL 3.46 Low     Hemoglobin 12.0 - 16.0 g/dL 7.1 Low     Hematocrit 37.0 - 47.0 % 25.8 Low     MCV 81.4 - 97.8 fL 74.6 Low     MCH 27.0 - 33.0 pg 20.5 Low     MCHC 33.6 - 35.0 g/dL 27.5 Low     RDW 35.9 - 50.0 fL 53.0 High

## 2021-08-31 ENCOUNTER — TELEPHONE (OUTPATIENT)
Dept: MEDICAL GROUP | Facility: PHYSICIAN GROUP | Age: 86
End: 2021-08-31

## 2021-08-31 DIAGNOSIS — D62 ANEMIA ASSOCIATED WITH ACUTE BLOOD LOSS: ICD-10-CM

## 2021-08-31 LAB
FERRITIN SERPL-MCNC: 9.1 NG/ML (ref 10–291)
IRON SATN MFR SERPL: 4 % (ref 15–55)
IRON SERPL-MCNC: 19 UG/DL (ref 40–170)
TIBC SERPL-MCNC: 442 UG/DL (ref 250–450)
UIBC SERPL-MCNC: 423 UG/DL (ref 110–370)

## 2021-08-31 NOTE — TELEPHONE ENCOUNTER
Spoke with Gwen at lab- CBC drawn yesterday clotted. Need recollect.   Called pt, she doesn't have a ride to office. Will order Phlebxpress for recollect.

## 2021-09-01 RX ORDER — ALBUTEROL SULFATE 90 UG/1
2 POWDER, METERED RESPIRATORY (INHALATION) EVERY 6 HOURS PRN
Qty: 1 EACH | Refills: 3 | Status: SHIPPED | OUTPATIENT
Start: 2021-09-01 | End: 2022-03-22

## 2021-09-02 ENCOUNTER — HOSPITAL ENCOUNTER (OUTPATIENT)
Facility: MEDICAL CENTER | Age: 86
End: 2021-09-02
Attending: FAMILY MEDICINE
Payer: MEDICARE

## 2021-09-02 DIAGNOSIS — D62 ANEMIA ASSOCIATED WITH ACUTE BLOOD LOSS: ICD-10-CM

## 2021-09-02 PROCEDURE — 85025 COMPLETE CBC W/AUTO DIFF WBC: CPT

## 2021-09-03 LAB
ANISOCYTOSIS BLD QL SMEAR: ABNORMAL
BASOPHILS # BLD AUTO: 0.7 % (ref 0–1.8)
BASOPHILS # BLD: 0.05 K/UL (ref 0–0.12)
COMMENT 1642: NORMAL
EOSINOPHIL # BLD AUTO: 0.1 K/UL (ref 0–0.51)
EOSINOPHIL NFR BLD: 1.5 % (ref 0–6.9)
ERYTHROCYTE [DISTWIDTH] IN BLOOD BY AUTOMATED COUNT: 55.2 FL (ref 35.9–50)
HCT VFR BLD AUTO: 26.2 % (ref 37–47)
HGB BLD-MCNC: 7.1 G/DL (ref 12–16)
HYPOCHROMIA BLD QL SMEAR: ABNORMAL
IMM GRANULOCYTES # BLD AUTO: 0.02 K/UL (ref 0–0.11)
IMM GRANULOCYTES NFR BLD AUTO: 0.3 % (ref 0–0.9)
LYMPHOCYTES # BLD AUTO: 1.02 K/UL (ref 1–4.8)
LYMPHOCYTES NFR BLD: 15.2 % (ref 22–41)
MCH RBC QN AUTO: 19.7 PG (ref 27–33)
MCHC RBC AUTO-ENTMCNC: 27.1 G/DL (ref 33.6–35)
MCV RBC AUTO: 72.6 FL (ref 81.4–97.8)
MICROCYTES BLD QL SMEAR: ABNORMAL
MONOCYTES # BLD AUTO: 0.56 K/UL (ref 0–0.85)
MONOCYTES NFR BLD AUTO: 8.3 % (ref 0–13.4)
MORPHOLOGY BLD-IMP: NORMAL
NEUTROPHILS # BLD AUTO: 4.97 K/UL (ref 2–7.15)
NEUTROPHILS NFR BLD: 74 % (ref 44–72)
NRBC # BLD AUTO: 0 K/UL
NRBC BLD-RTO: 0 /100 WBC
OVALOCYTES BLD QL SMEAR: NORMAL
PLATELET # BLD AUTO: 319 K/UL (ref 164–446)
PLATELET BLD QL SMEAR: NORMAL
PMV BLD AUTO: 11.4 FL (ref 9–12.9)
POIKILOCYTOSIS BLD QL SMEAR: NORMAL
POLYCHROMASIA BLD QL SMEAR: NORMAL
RBC # BLD AUTO: 3.61 M/UL (ref 4.2–5.4)
RBC BLD AUTO: PRESENT
SCHISTOCYTES BLD QL SMEAR: NORMAL
WBC # BLD AUTO: 6.7 K/UL (ref 4.8–10.8)

## 2022-01-31 ENCOUNTER — TELEPHONE (OUTPATIENT)
Dept: MEDICAL GROUP | Facility: PHYSICIAN GROUP | Age: 87
End: 2022-01-31

## 2022-01-31 NOTE — TELEPHONE ENCOUNTER
----- Message from FLAKITO Mcclellan sent at 1/31/2022  9:27 AM PST -----  Regarding: call to f/u  Can we call Tori the daughter in law to make sure Sherri is doing okay and ask if they would like to reschedule for another time?

## 2022-01-31 NOTE — TELEPHONE ENCOUNTER
VOICEMAIL  1. Caller Name: Tori                         Call Back Number: [phone]    2. Message: Pt missed apt this morning. Needing to R/S     3. Patient approves office to leave a detailed voicemail/MyChart message: N\A

## 2022-02-01 NOTE — TELEPHONE ENCOUNTER
VOICEMAIL  1. Caller Name: Sherri Balbuena                        Call Back Number: [phone] (home)       2. Message: Follow up with pt and reschedule apt.     3. Patient approves office to leave a detailed voicemail/MyChart message: N\A

## 2022-03-22 ENCOUNTER — HOSPITAL ENCOUNTER (OUTPATIENT)
Facility: MEDICAL CENTER | Age: 87
End: 2022-03-23
Attending: EMERGENCY MEDICINE | Admitting: FAMILY MEDICINE
Payer: MEDICARE

## 2022-03-22 DIAGNOSIS — R53.81 DEBILITY: ICD-10-CM

## 2022-03-22 PROBLEM — D64.9 ANEMIA: Status: ACTIVE | Noted: 2022-03-22

## 2022-03-22 LAB
ABO GROUP BLD: NORMAL
ANION GAP SERPL CALC-SCNC: 12 MMOL/L (ref 7–16)
ANISOCYTOSIS BLD QL SMEAR: ABNORMAL
BARCODED ABORH UBTYP: 9500
BARCODED PRD CODE UBPRD: NORMAL
BARCODED UNIT NUM UBUNT: NORMAL
BASOPHILS # BLD AUTO: 0.2 % (ref 0–1.8)
BASOPHILS # BLD AUTO: 0.4 % (ref 0–1.8)
BASOPHILS # BLD: 0.02 K/UL (ref 0–0.12)
BASOPHILS # BLD: 0.04 K/UL (ref 0–0.12)
BLD GP AB SCN SERPL QL: NORMAL
BUN SERPL-MCNC: 32 MG/DL (ref 8–22)
CALCIUM SERPL-MCNC: 9.5 MG/DL (ref 8.5–10.5)
CFT BLD TEG: 2.4 MIN (ref 4.6–9.1)
CFT P HPASE BLD TEG: 2.3 MIN (ref 4.3–8.3)
CHLORIDE SERPL-SCNC: 105 MMOL/L (ref 96–112)
CLOT ANGLE BLD TEG: 79.8 DEGREES (ref 63–78)
CO2 SERPL-SCNC: 22 MMOL/L (ref 20–33)
COMMENT 1642: NORMAL
COMPONENT R 8504R: NORMAL
CREAT SERPL-MCNC: 0.73 MG/DL (ref 0.5–1.4)
CT.EXTRINSIC BLD ROTEM: 0.8 MIN (ref 0.8–2.1)
EOSINOPHIL # BLD AUTO: 0.03 K/UL (ref 0–0.51)
EOSINOPHIL # BLD AUTO: 0.08 K/UL (ref 0–0.51)
EOSINOPHIL NFR BLD: 0.3 % (ref 0–6.9)
EOSINOPHIL NFR BLD: 0.9 % (ref 0–6.9)
ERYTHROCYTE [DISTWIDTH] IN BLOOD BY AUTOMATED COUNT: 55 FL (ref 35.9–50)
ERYTHROCYTE [DISTWIDTH] IN BLOOD BY AUTOMATED COUNT: 64.9 FL (ref 35.9–50)
FERRITIN SERPL-MCNC: 20.1 NG/ML (ref 10–291)
FIBRINOGEN PPP-MCNC: 434 MG/DL (ref 215–460)
GFR SERPLBLD CREATININE-BSD FMLA CKD-EPI: 76 ML/MIN/1.73 M 2
GLUCOSE SERPL-MCNC: 136 MG/DL (ref 65–99)
HCT VFR BLD AUTO: 17.5 % (ref 37–47)
HCT VFR BLD AUTO: 31.4 % (ref 37–47)
HGB BLD-MCNC: 10.1 G/DL (ref 12–16)
HGB BLD-MCNC: 4.8 G/DL (ref 12–16)
HYPOCHROMIA BLD QL SMEAR: ABNORMAL
IMM GRANULOCYTES # BLD AUTO: 0.03 K/UL (ref 0–0.11)
IMM GRANULOCYTES # BLD AUTO: 0.19 K/UL (ref 0–0.11)
IMM GRANULOCYTES NFR BLD AUTO: 0.3 % (ref 0–0.9)
IMM GRANULOCYTES NFR BLD AUTO: 2.1 % (ref 0–0.9)
IRON SATN MFR SERPL: 5 % (ref 15–55)
IRON SERPL-MCNC: 18 UG/DL (ref 40–170)
LYMPHOCYTES # BLD AUTO: 0.36 K/UL (ref 1–4.8)
LYMPHOCYTES # BLD AUTO: 0.54 K/UL (ref 1–4.8)
LYMPHOCYTES NFR BLD: 3.8 % (ref 22–41)
LYMPHOCYTES NFR BLD: 5.9 % (ref 22–41)
MCF BLD TEG: 71.3 MM (ref 52–69)
MCF.PLATELET INHIB BLD ROTEM: 39 MM (ref 15–32)
MCH RBC QN AUTO: 23.3 PG (ref 27–33)
MCH RBC QN AUTO: 26.2 PG (ref 27–33)
MCHC RBC AUTO-ENTMCNC: 27.4 G/DL (ref 33.6–35)
MCHC RBC AUTO-ENTMCNC: 32.2 G/DL (ref 33.6–35)
MCV RBC AUTO: 81.6 FL (ref 81.4–97.8)
MCV RBC AUTO: 85 FL (ref 81.4–97.8)
MICROCYTES BLD QL SMEAR: ABNORMAL
MONOCYTES # BLD AUTO: 0.62 K/UL (ref 0–0.85)
MONOCYTES # BLD AUTO: 0.63 K/UL (ref 0–0.85)
MONOCYTES NFR BLD AUTO: 6.6 % (ref 0–13.4)
MONOCYTES NFR BLD AUTO: 6.8 % (ref 0–13.4)
MORPHOLOGY BLD-IMP: NORMAL
NEUTROPHILS # BLD AUTO: 7.7 K/UL (ref 2–7.15)
NEUTROPHILS # BLD AUTO: 8.42 K/UL (ref 2–7.15)
NEUTROPHILS NFR BLD: 83.9 % (ref 44–72)
NEUTROPHILS NFR BLD: 88.8 % (ref 44–72)
NRBC # BLD AUTO: 0.08 K/UL
NRBC # BLD AUTO: 0.42 K/UL
NRBC BLD-RTO: 0.8 /100 WBC
NRBC BLD-RTO: 4.6 /100 WBC
PLATELET # BLD AUTO: 223 K/UL (ref 164–446)
PLATELET # BLD AUTO: 294 K/UL (ref 164–446)
PLATELET BLD QL SMEAR: NORMAL
PMV BLD AUTO: 10.9 FL (ref 9–12.9)
PMV BLD AUTO: 12.1 FL (ref 9–12.9)
POLYCHROMASIA BLD QL SMEAR: NORMAL
POTASSIUM SERPL-SCNC: 4.1 MMOL/L (ref 3.6–5.5)
PRODUCT TYPE UPROD: NORMAL
RBC # BLD AUTO: 2.06 M/UL (ref 4.2–5.4)
RBC # BLD AUTO: 3.85 M/UL (ref 4.2–5.4)
RBC BLD AUTO: PRESENT
RH BLD: NORMAL
SODIUM SERPL-SCNC: 139 MMOL/L (ref 135–145)
TEG ALGORITHM TGALG: ABNORMAL
TIBC SERPL-MCNC: 354 UG/DL (ref 250–450)
UIBC SERPL-MCNC: 336 UG/DL (ref 110–370)
UNIT STATUS USTAT: NORMAL
WBC # BLD AUTO: 9.2 K/UL (ref 4.8–10.8)
WBC # BLD AUTO: 9.5 K/UL (ref 4.8–10.8)

## 2022-03-22 PROCEDURE — 700102 HCHG RX REV CODE 250 W/ 637 OVERRIDE(OP): Performed by: STUDENT IN AN ORGANIZED HEALTH CARE EDUCATION/TRAINING PROGRAM

## 2022-03-22 PROCEDURE — 99285 EMERGENCY DEPT VISIT HI MDM: CPT

## 2022-03-22 PROCEDURE — 700105 HCHG RX REV CODE 258: Performed by: EMERGENCY MEDICINE

## 2022-03-22 PROCEDURE — 36430 TRANSFUSION BLD/BLD COMPNT: CPT

## 2022-03-22 PROCEDURE — 700105 HCHG RX REV CODE 258: Performed by: STUDENT IN AN ORGANIZED HEALTH CARE EDUCATION/TRAINING PROGRAM

## 2022-03-22 PROCEDURE — 99219 PR INITIAL OBSERVATION CARE,LEVL II: CPT | Mod: GC | Performed by: FAMILY MEDICINE

## 2022-03-22 PROCEDURE — 83540 ASSAY OF IRON: CPT

## 2022-03-22 PROCEDURE — 85384 FIBRINOGEN ACTIVITY: CPT

## 2022-03-22 PROCEDURE — 83550 IRON BINDING TEST: CPT

## 2022-03-22 PROCEDURE — 700111 HCHG RX REV CODE 636 W/ 250 OVERRIDE (IP): Performed by: STUDENT IN AN ORGANIZED HEALTH CARE EDUCATION/TRAINING PROGRAM

## 2022-03-22 PROCEDURE — 86923 COMPATIBILITY TEST ELECTRIC: CPT | Mod: 91

## 2022-03-22 PROCEDURE — A9270 NON-COVERED ITEM OR SERVICE: HCPCS | Performed by: STUDENT IN AN ORGANIZED HEALTH CARE EDUCATION/TRAINING PROGRAM

## 2022-03-22 PROCEDURE — 82728 ASSAY OF FERRITIN: CPT

## 2022-03-22 PROCEDURE — G0378 HOSPITAL OBSERVATION PER HR: HCPCS

## 2022-03-22 PROCEDURE — 86900 BLOOD TYPING SEROLOGIC ABO: CPT

## 2022-03-22 PROCEDURE — 36415 COLL VENOUS BLD VENIPUNCTURE: CPT

## 2022-03-22 PROCEDURE — P9016 RBC LEUKOCYTES REDUCED: HCPCS | Mod: 91

## 2022-03-22 PROCEDURE — 96365 THER/PROPH/DIAG IV INF INIT: CPT

## 2022-03-22 PROCEDURE — 85025 COMPLETE CBC W/AUTO DIFF WBC: CPT

## 2022-03-22 PROCEDURE — 86850 RBC ANTIBODY SCREEN: CPT

## 2022-03-22 PROCEDURE — 86901 BLOOD TYPING SEROLOGIC RH(D): CPT

## 2022-03-22 PROCEDURE — 85347 COAGULATION TIME ACTIVATED: CPT

## 2022-03-22 PROCEDURE — 80048 BASIC METABOLIC PNL TOTAL CA: CPT

## 2022-03-22 PROCEDURE — 85576 BLOOD PLATELET AGGREGATION: CPT

## 2022-03-22 PROCEDURE — 85384 FIBRINOGEN ACTIVITY: CPT | Mod: 91

## 2022-03-22 RX ORDER — SODIUM CHLORIDE 9 MG/ML
INJECTION, SOLUTION INTRAVENOUS CONTINUOUS
Status: ACTIVE | OUTPATIENT
Start: 2022-03-22 | End: 2022-03-22

## 2022-03-22 RX ORDER — BISACODYL 10 MG
10 SUPPOSITORY, RECTAL RECTAL
Status: DISCONTINUED | OUTPATIENT
Start: 2022-03-22 | End: 2022-03-23 | Stop reason: HOSPADM

## 2022-03-22 RX ORDER — ALBUTEROL SULFATE 90 UG/1
2 AEROSOL, METERED RESPIRATORY (INHALATION)
Status: DISCONTINUED | OUTPATIENT
Start: 2022-03-22 | End: 2022-03-23 | Stop reason: HOSPADM

## 2022-03-22 RX ORDER — DIPHENHYDRAMINE HCL 25 MG
25 TABLET ORAL ONCE
Status: COMPLETED | OUTPATIENT
Start: 2022-03-22 | End: 2022-03-22

## 2022-03-22 RX ORDER — POLYETHYLENE GLYCOL 3350 17 G/17G
1 POWDER, FOR SOLUTION ORAL
Status: DISCONTINUED | OUTPATIENT
Start: 2022-03-22 | End: 2022-03-23 | Stop reason: HOSPADM

## 2022-03-22 RX ORDER — ACETAMINOPHEN 325 MG/1
650 TABLET ORAL EVERY 6 HOURS PRN
Status: DISCONTINUED | OUTPATIENT
Start: 2022-03-22 | End: 2022-03-23 | Stop reason: HOSPADM

## 2022-03-22 RX ORDER — DIPHENHYDRAMINE HYDROCHLORIDE 50 MG/ML
25 INJECTION INTRAMUSCULAR; INTRAVENOUS ONCE
Status: COMPLETED | OUTPATIENT
Start: 2022-03-22 | End: 2022-03-22

## 2022-03-22 RX ORDER — ALBUTEROL SULFATE 90 UG/1
2 AEROSOL, METERED RESPIRATORY (INHALATION)
Status: DISCONTINUED | OUTPATIENT
Start: 2022-03-22 | End: 2022-03-22

## 2022-03-22 RX ORDER — AMOXICILLIN 250 MG
2 CAPSULE ORAL 2 TIMES DAILY
Status: DISCONTINUED | OUTPATIENT
Start: 2022-03-22 | End: 2022-03-23 | Stop reason: HOSPADM

## 2022-03-22 RX ORDER — ACETAMINOPHEN 325 MG/1
650 TABLET ORAL ONCE
Status: COMPLETED | OUTPATIENT
Start: 2022-03-22 | End: 2022-03-22

## 2022-03-22 RX ADMIN — DIPHENHYDRAMINE HYDROCHLORIDE 25 MG: 25 TABLET ORAL at 16:53

## 2022-03-22 RX ADMIN — SODIUM CHLORIDE 1375 MG: 9 INJECTION, SOLUTION INTRAVENOUS at 17:45

## 2022-03-22 RX ADMIN — SODIUM CHLORIDE: 9 INJECTION, SOLUTION INTRAVENOUS at 10:49

## 2022-03-22 RX ADMIN — ACETAMINOPHEN 650 MG: 325 TABLET, FILM COATED ORAL at 16:52

## 2022-03-22 RX ADMIN — SODIUM CHLORIDE 25 MG: 9 INJECTION, SOLUTION INTRAVENOUS at 17:14

## 2022-03-22 ASSESSMENT — FIBROSIS 4 INDEX: FIB4 SCORE: 2.16

## 2022-03-22 NOTE — ED TRIAGE NOTES
Patient biba from home for generalized weakness x 2 weeks. Patient denies any medical problems and or taking maintenance medications. EMS reports she was last seen in the ER for weakness secondary to hemoptysis and hematochezia. OA, patient denies hemoptysis or hematochezia. Patient appears generally pale but in no respiratory distress and vital signs are stable at this time.

## 2022-03-22 NOTE — ED NOTES
Confirmed with MD that patient is ok to have food and water at this time. Patient provided cup of ice water and patient tolerating PRBC.

## 2022-03-22 NOTE — ED NOTES
Followed up with  regarding PRBC order and confirmed that he would like patient to receive 2 more units of PRBC; MD will order additional units.

## 2022-03-22 NOTE — ED NOTES
Called and followed up with blood bank regarding PRBC; per  blood will be ready in approximately 25 minutes from now. Patient's vital signs are stable.

## 2022-03-22 NOTE — PROGRESS NOTES
IV Iron Per Pharmacy Note    Patient Lean Body Weight:  43.1 kg  Reason for Iron Replacement: Iron Deficiency Anemia    Lab Results   Component Value Date/Time    WBC 9.5 03/22/2022 08:28 AM    RBC 2.06 (L) 03/22/2022 08:28 AM    HEMOGLOBIN 4.8 (LL) 03/22/2022 08:28 AM    HEMATOCRIT 17.5 (LL) 03/22/2022 08:28 AM    MCV 85.0 03/22/2022 08:28 AM    MCH 23.3 (L) 03/22/2022 08:28 AM    MCHC 27.4 (L) 03/22/2022 08:28 AM    MPV 12.1 03/22/2022 08:28 AM       Recent Labs     03/22/22  0828   FERRITIN 20.1   IRON 18*         Recent Labs     03/22/22  0828   CREATININE 0.73          Assessment/Plan:  1. IV Iron Indicated.   2. Following diphenhydramine/acetaminophen premeds, administer Iron Dextran 25 mg IV test dose over 30 minutes.  3. If no reaction (Anaphylaxis, Hypotension/Hypertension, N/V/D, Chest pain/Back Pain, Urticaria/Pruritis) in the next hour, proceed to full dose.   4. Full dose: Iron Dextran 1,375 mg IV over 4 hours. Continue to monitor for delayed ADR including: Arthralgia/myalgia, Headache/backache, chills/dizziness/malaise, moderate to high fever and n/v.      Shanae Krueger, PharmD, BCPS

## 2022-03-22 NOTE — ED NOTES
Assist RN: VS re-assessed, pt resting in gurney, resp even and unlabored, and denies and changes or complaints r/t RBC infusion.

## 2022-03-22 NOTE — ED NOTES
Patient transferred and ambulated from [facility] to chair with 1 person assistance and tolerated well. Patient transferred to hospital bed.

## 2022-03-22 NOTE — ED PROVIDER NOTES
ED Provider Note    Scribed for Patrick Sanchez M.D. by Patrick Sanchez M.D.. 3/22/2022,  8:34 AM.    CHIEF COMPLAINT  Chief Complaint   Patient presents with   • Weakness       HPI  Sherri Balbuena is a 89 y.o. female who presents to the Emergency Department for generalized weakness for the past 2 weeks.  This feels identical to her to a previous episode of symptomatic anemia.  Records show she was admitted in December 2020, and has had lab tests in September and August of this past summer and fall that showed she was just barely above 7, with iron deficiency anemia.  She has not had any falls or trauma.  She has not had fevers or chills, nausea or vomiting.  She reports she had a cough starting last night, but does not think this is related to her weakness, which has been present for much longer.  She is accompanied by her daughter-in-law, who reports that she is tired when she walks just a few steps, where as 4 weeks ago or more, as long as she had her walker, she could walk relatively easily anywhere she wanted to go, without significant exertional fatigue.  The patient is alert and pleasant and cooperative, remarkably sharp for her 89 years.      REVIEW OF SYSTEMS  See HPI for further details. All other systems are negative.     PAST MEDICAL HISTORY   has a past medical history of Asthma and Hypertension.    SOCIAL HISTORY  Social History     Tobacco Use   • Smoking status: Never Smoker   • Smokeless tobacco: Never Used   Substance and Sexual Activity   • Alcohol use: Yes     Alcohol/week: 0.6 oz     Types: 1 Standard drinks or equivalent per week     Comment: rarely, martini   • Drug use: No   • Sexual activity: Not on file     Social History     Substance and Sexual Activity   Drug Use No       SURGICAL HISTORY   has a past surgical history that includes hip nailing intramedullary (Right, 5/23/2015); gastroscopy (N/A, 12/15/2020); and colonoscopy,diagnostic (N/A, 12/16/2020).    CURRENT  "MEDICATIONS  Home Medications     Reviewed by Nate Trivedi (Pharmacy Tech) on 22 at 1021  Med List Status: Complete   Medication Last Dose Status        Patient Daron Taking any Medications                       ALLERGIES  No Known Allergies    PHYSICAL EXAM  VITAL SIGNS: /58   Pulse 76   Temp 37.1 °C (98.8 °F) (Temporal)   Resp 15   Ht 1.549 m (5' 1\")   Wt 43.1 kg (95 lb)   SpO2 92%   BMI 17.95 kg/m²   Pulse ox interpretation: I interpret this pulse ox as normal.  Constitutional: Alert in no apparent distress.  HENT: Pale mucosa, no signs of trauma, Bilateral external ears normal, Nose normal.   Eyes: Conjunctiva normal, Non-icteric.   Neck: Normal range of motion, Supple, No stridor.   Lymphatic: No lymphadenopathy noted.   Cardiovascular: Regular rate and rhythm, no murmurs.   Thorax & Lungs: Normal breath sounds, No respiratory distress, No wheezing, No chest tenderness.   Abdomen: Bowel sounds normal, Soft, No tenderness, No masses, No pulsatile masses. No peritoneal signs.  Skin: Warm, Dry, No erythema, No rash.   Extremities: Intact distal pulses, No edema, No cyanosis.  Musculoskeletal: Good range of motion in all major joints. No or major deformities noted.   Neurologic: Alert , Normal motor function, Normal sensory function, No focal deficits noted.   Psychiatric: Affect normal, Judgment normal, Mood normal.     DIAGNOSTIC STUDIES / PROCEDURES    EKG  Interpreted by me    Results for orders placed or performed during the hospital encounter of 20   EKG (NOW)   Result Value Ref Range    Report       [facility] Emergency Dept.    Test Date:  2020  Pt Name:    RJ TEJADA           Department: ER  MRN:        [id number]                      Room:        16  Gender:     Female                       Technician: [id number]  :        [id number]                   Requested By:MAGGY CASTILLO  Order #:    [id number]                    Jayme MD: " MAGGY CASTILLO D.O.    Measurements  Intervals                                Axis  Rate:       79                           P:          46  IL:         164                          QRS:        21  QRSD:       122                          T:          124  QT:         367  QTc:        421    Interpretive Statements  Sinus rhythm  Nonspecific intraventricular conduction delay  Borderline repolarization abnormality  Baseline wander in lead(s) V4  Compared to ECG 05/22/2015 19:12:04  Intraventricular conduction delay now present  T-wave abnormality no longer present  Electronically Signed On 12- 14:51:21 PST  by MAGGY CASTILLO D.O.           LABS  Labs Reviewed   CBC WITH DIFFERENTIAL - Abnormal; Notable for the following components:       Result Value    RBC 2.06 (*)     Hemoglobin 4.8 (*)     Hematocrit 17.5 (*)     MCH 23.3 (*)     MCHC 27.4 (*)     RDW 64.9 (*)     Neutrophils-Polys 88.80 (*)     Lymphocytes 3.80 (*)     Neutrophils (Absolute) 8.42 (*)     Lymphs (Absolute) 0.36 (*)     Hypochromia 2+ (*)     All other components within normal limits   BASIC METABOLIC PANEL - Abnormal; Notable for the following components:    Glucose 136 (*)     Bun 32 (*)     All other components within normal limits   IRON/TOTAL IRON BIND - Abnormal; Notable for the following components:    Iron 18 (*)     % Saturation 5 (*)     All other components within normal limits   BASIC TEG - Abnormal; Notable for the following components:    Reaction Time Initial-R 2.4 (*)     React Time Initial Hep 2.3 (*)     Clot Angle-Angle 79.8 (*)     Maximum Clot Strength-MA 71.3 (*)     TEG Functional Fibrinogen(MA) 39.0 (*)     All other components within normal limits    Narrative:     Do you want to extend TEG graph to LY30? (If no, graph will  terminate at MA)->No   CBC WITH DIFFERENTIAL - Abnormal; Notable for the following components:    RBC 3.85 (*)     Hemoglobin 10.1 (*)     Hematocrit 31.4 (*)     MCH 26.2 (*)     MCHC  32.2 (*)     RDW 55.0 (*)     Neutrophils-Polys 83.90 (*)     Lymphocytes 5.90 (*)     Immature Granulocytes 2.10 (*)     Neutrophils (Absolute) 7.70 (*)     Lymphs (Absolute) 0.54 (*)     Immature Granulocytes (abs) 0.19 (*)     All other components within normal limits    Narrative:     To be collected following final transfusion   CBC WITH DIFFERENTIAL - Abnormal; Notable for the following components:    RBC 3.78 (*)     Hemoglobin 10.0 (*)     Hematocrit 31.2 (*)     MCH 26.5 (*)     MCHC 32.1 (*)     RDW 56.7 (*)     Neutrophils-Polys 80.50 (*)     Lymphocytes 8.40 (*)     Immature Granulocytes 1.10 (*)     Lymphs (Absolute) 0.74 (*)     All other components within normal limits   BASIC METABOLIC PANEL - Abnormal; Notable for the following components:    Co2 18 (*)     Glucose 109 (*)     Bun 29 (*)     All other components within normal limits   CBC WITHOUT DIFFERENTIAL - Abnormal; Notable for the following components:    RBC 3.96 (*)     Hemoglobin 10.2 (*)     Hematocrit 32.3 (*)     MCH 25.8 (*)     MCHC 31.6 (*)     RDW 56.3 (*)     All other components within normal limits   FERRITIN   COD (ADULT)   FIBRINOGEN    Narrative:     Do you want to extend TEG graph to LY30? (If no, graph will  terminate at MA)->No   ESTIMATED GFR   PERIPHERAL SMEAR REVIEW   PLATELET ESTIMATE   MORPHOLOGY   DIFFERENTIAL COMMENT   ESTIMATED GFR   OCCULT BLOOD STOOL   RELEASE RED BLOOD CELLS   RELEASE RED BLOOD CELLS   RELEASE RED BLOOD CELLS   TRANSFUSE RED BLOOD CELLS-NURSING COMMUNICATION (UNITS)   TRANSFUSE RED BLOOD CELLS-NURSING COMMUNICATION (UNITS)   TRANSFUSE RED BLOOD CELLS-NURSING COMMUNICATION (UNITS)     All labs reviewed by me.    RADIOLOGY  No orders to display     The radiologist's interpretation of all radiological studies have been reviewed by me.    COURSE & MEDICAL DECISION MAKING  Nursing notes, VS, PMSFHx reviewed in chart.     8:34 AM Patient seen and examined at bedside. Differential diagnosis includes but  is not limited to symptomatic anemia, upper or lower GI bleed, iron deficiency anemia, dehydration, electrolyte abnormality, hypothyroidism. Ordered for screening laboratory tests to evaluate.     9:30 AM lab called with a critical hemoglobin of less than 5.  Patient will be transfused 1 unit of PRBCs, but will likely require more.  I paged the hospitalist for admission and updated the patient and her family.    10:00 AM I spoke with the R resident, and they will admit.    DISPOSITION:  Patient will be admitted to [facility] in stable condition.      FINAL IMPRESSION  1. Symptomatic anemia

## 2022-03-22 NOTE — ED NOTES
Patient tolerating PRBC infusion at this time. No respiratory distress. Patient denies any itchiness or signs of allergic reaction. Vital signs remain stable.

## 2022-03-22 NOTE — H&P
"[facility] MEDICINE HISTORY AND PHYSICAL     PATIENT ID:  NAME:  Sherri Balbuena  MRN:               [id number]  YOB: 1928    Date of Admission: 3/22/2022     Attending: Dallas Dolan     Resident: Gary Rodriguez MD     Primary Care:  Rosalia Roque NP    CC:  Generalized weakness      HPI: Sherri Balbuena is a 89 y.o. female with PMHx of severe ASHER, erosive distal gastritis, small internal hemorrhoids and asthma who presented with 2 weeks of generalized weakness at home. She is here with her daughter in law.    Pt reports that she has been too weak to walk. In the past, she has had anemia and weakness as well and her strength recovered with transfusion. She has not had any bloody stools or vomiting.  Last night, she was coughing up yellow phlem. She has been drinking \"lots of water\". She denies HA, syncope, SOB.     She has a history of asthma with an inhaler at home but she is unable to use it because she is not strong enough. She reports that she can get by without needing to take it.    New heart murmur noted by PCP 08/21  POLST signed 08/21 in outpatient clinic    ERCourse:  BIB EMS, BP 97/49, afebrile, saturating 97% without supplemental O2. Workup shows hgb 4.8, CMP with BUN 32 and Cr 0.7, iron panel with Iron 18, TIBC 354, 5% saturation, ferritin 20.1. Consistent with prior labs.     REVIEW OF SYSTEMS:   Ten systems reviewed and were negative except as noted in the HPI.                PAST MEDICAL HISTORY:  Past Medical History:   Diagnosis Date   • Asthma     Since she was 7   • Hypertension        PAST SURGICAL HISTORY:  Past Surgical History:   Procedure Laterality Date   • PB COLONOSCOPY,DIAGNOSTIC N/A 12/16/2020    Procedure: COLONOSCOPY;  Surgeon: Ganga Harris M.D.;  Location: SURGERY SAME DAY [facility];  Service: Gastroenterology   • GASTROSCOPY N/A 12/15/2020    Procedure: GASTROSCOPY;  Surgeon: Ganga Harris M.D.;  Location: SURGERY SAME DAY [facility];  Service: " "Gastroenterology   • HIP NAILING INTRAMEDULLARY Right 2015    Procedure: HIP NAILING INTRAMEDULLARY;  Surgeon: James Young M.D.;  Location: SURGERY [facility];  Service:        FAMILY HISTORY:  No family history on file.    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  Living situation: lives by herself, is able to care for herself independently.  Recent travel:  Denies.  Primary Care Provider:  Rosalia Roque NP  Other (stressors, spirituality, exposures):  none    DIET:   No orders of the defined types were placed in this encounter.      ALLERGIES:  No Known Allergies    OUTPATIENT MEDICATIONS:    Current Facility-Administered Medications:   •  NS infusion, , Intravenous, Continuous, Patrick Sanchez M.D.  •  senna-docusate (PERICOLACE or SENOKOT S) 8.6-50 MG per tablet 2 Tablet, 2 Tablet, Oral, BID **AND** polyethylene glycol/lytes (MIRALAX) PACKET 1 Packet, 1 Packet, Oral, QDAY PRN **AND** magnesium hydroxide (MILK OF MAGNESIA) suspension 30 mL, 30 mL, Oral, QDAY PRN **AND** bisacodyl (DULCOLAX) suppository 10 mg, 10 mg, Rectal, QDAY PRN, Gary Rodriguez M.D.  •  acetaminophen (Tylenol) tablet 650 mg, 650 mg, Oral, Q6HRS PRN, Gary Rodriguez M.D.  No current outpatient medications on file.    Home Meds:  - albuterol (unable to administer to herself though, and she lives alone)    PHYSICAL EXAM:  Vitals:    22 0827 22 0828 22 0918   BP:  109/58 (!) 97/49   Pulse:  82 65   Resp:  14 17   Temp:  36.7 °C (98.1 °F)    TempSrc:  Temporal    SpO2:  97% 98%   Weight: 43.1 kg (95 lb)     Height: 1.549 m (5' 1\")     , Temp (24hrs), Av.7 °C (98.1 °F), Min:36.7 °C (98.1 °F), Max:36.7 °C (98.1 °F)  , Pulse Oximetry: 98 %, O2 " Delivery Device: None - Room Air    General: Pt resting in NAD, cooperative, pleasant  Skin:  Pale, warm and dry.  No rashes  HEENT: NC/AT. EOMI. MMM. No nasal discharge.  Lungs:  Symmetrical.  CTAB with no W/R/R.  Good air movement   Cardiovascular:  S1/S2 RRR with a 2/5 systolic ejection murmur  Abdomen:  Abdomen is soft, nontender, nondistended. No masses noted.  Extremities:  Full range of motion. No gross deformities noted. 2+ pulses in all extremities. No edema.   Spine:  Straight without vertebral anomalies.  Neuro: Non-focal. A&Ox4  Psych: Appropriate affect, positive mood    LAB TESTS:   No results for input(s): WBC, RBC, HEMOGLOBIN, HEMATOCRIT, MCV, MCH, RDW, PLATELETCT, MPV, NEUTSPOLYS, LYMPHOCYTES, MONOCYTES, EOSINOPHILS, BASOPHILS, RBCMORPHOLO in the last 72 hours.      Recent Labs     03/22/22  0828   SODIUM 139   POTASSIUM 4.1   CHLORIDE 105   CO2 22   BUN 32*   CREATININE 0.73   CALCIUM 9.5       CULTURES:   Results     ** No results found for the last 168 hours. **          IMAGES:  No orders to display       CONSULTS:   none    ASSESSMENT/PLAN:   89 y.o. female admitted for severe iron-deficiency anemia    #Severe anemia  #Iron deficiency  #Weakness  Pt complains of weakness. On physical exam, her BP is soft and she appears somewhat pale. She has severe anemia with Hgb 4.8. It is normocytic with MCV 85, but MCH decreased at 23. Her Iron level is low at 18. Ferritin is 20 today but % saturation was 5. TIBC was normal. CMP with BUN 32 and Cr 0.7, concerning for dehydration. PMHx of hemoptysis and hematochezia in the past, but denies at this time.   DDx: Most likely iron deficiency anemia, but pt has hx of hematochezia and hemoptysis so we will check FOB.  - Pending FOB  - Transfusions ordered in the ER  - Transfuse for Hgb<7  - Daily CBC to assess response  - IV Iron  - Encourage PO intake  - PO Fe supplements on discharge  - Due to hx of hematochezia and hemoptysis, holding pharmacological DVT PPx  at this time. SCDs ordered.    #Asthma, mild  Pt reports mild asthma  - Albuterol PRN    #Dispo: admit to obs for blood transfusion and monitoring of H&H    Core Measures:   Fluids: PRBC  Diet: Regular  Lines: PIV  Abx: none  DVT prophylaxis: SCDs  Code Status: DNR/DNI      Gary Rodriguez MD  PGY-1  [facility]

## 2022-03-23 ENCOUNTER — PHARMACY VISIT (OUTPATIENT)
Dept: PHARMACY | Facility: MEDICAL CENTER | Age: 87
End: 2022-03-23
Payer: COMMERCIAL

## 2022-03-23 ENCOUNTER — HOME HEALTH ADMISSION (OUTPATIENT)
Dept: HOME HEALTH SERVICES | Facility: HOME HEALTHCARE | Age: 87
End: 2022-03-23
Payer: MEDICARE

## 2022-03-23 VITALS
HEIGHT: 61 IN | SYSTOLIC BLOOD PRESSURE: 110 MMHG | BODY MASS INDEX: 17.94 KG/M2 | WEIGHT: 95 LBS | TEMPERATURE: 98.8 F | OXYGEN SATURATION: 92 % | HEART RATE: 76 BPM | DIASTOLIC BLOOD PRESSURE: 58 MMHG | RESPIRATION RATE: 15 BRPM

## 2022-03-23 PROBLEM — R53.81 DEBILITY: Status: ACTIVE | Noted: 2022-03-23

## 2022-03-23 LAB
ANION GAP SERPL CALC-SCNC: 13 MMOL/L (ref 7–16)
BASOPHILS # BLD AUTO: 0.3 % (ref 0–1.8)
BASOPHILS # BLD: 0.03 K/UL (ref 0–0.12)
BUN SERPL-MCNC: 29 MG/DL (ref 8–22)
CALCIUM SERPL-MCNC: 9.4 MG/DL (ref 8.5–10.5)
CHLORIDE SERPL-SCNC: 107 MMOL/L (ref 96–112)
CO2 SERPL-SCNC: 18 MMOL/L (ref 20–33)
CREAT SERPL-MCNC: 0.66 MG/DL (ref 0.5–1.4)
EOSINOPHIL # BLD AUTO: 0.09 K/UL (ref 0–0.51)
EOSINOPHIL NFR BLD: 1 % (ref 0–6.9)
ERYTHROCYTE [DISTWIDTH] IN BLOOD BY AUTOMATED COUNT: 56.3 FL (ref 35.9–50)
ERYTHROCYTE [DISTWIDTH] IN BLOOD BY AUTOMATED COUNT: 56.7 FL (ref 35.9–50)
GFR SERPLBLD CREATININE-BSD FMLA CKD-EPI: 81 ML/MIN/1.73 M 2
GLUCOSE SERPL-MCNC: 109 MG/DL (ref 65–99)
HCT VFR BLD AUTO: 31.2 % (ref 37–47)
HCT VFR BLD AUTO: 32.3 % (ref 37–47)
HGB BLD-MCNC: 10 G/DL (ref 12–16)
HGB BLD-MCNC: 10.2 G/DL (ref 12–16)
IMM GRANULOCYTES # BLD AUTO: 0.1 K/UL (ref 0–0.11)
IMM GRANULOCYTES NFR BLD AUTO: 1.1 % (ref 0–0.9)
LYMPHOCYTES # BLD AUTO: 0.74 K/UL (ref 1–4.8)
LYMPHOCYTES NFR BLD: 8.4 % (ref 22–41)
MCH RBC QN AUTO: 25.8 PG (ref 27–33)
MCH RBC QN AUTO: 26.5 PG (ref 27–33)
MCHC RBC AUTO-ENTMCNC: 31.6 G/DL (ref 33.6–35)
MCHC RBC AUTO-ENTMCNC: 32.1 G/DL (ref 33.6–35)
MCV RBC AUTO: 81.6 FL (ref 81.4–97.8)
MCV RBC AUTO: 82.5 FL (ref 81.4–97.8)
MONOCYTES # BLD AUTO: 0.77 K/UL (ref 0–0.85)
MONOCYTES NFR BLD AUTO: 8.7 % (ref 0–13.4)
NEUTROPHILS # BLD AUTO: 7.11 K/UL (ref 2–7.15)
NEUTROPHILS NFR BLD: 80.5 % (ref 44–72)
NRBC # BLD AUTO: 0.25 K/UL
NRBC BLD-RTO: 2.8 /100 WBC
PLATELET # BLD AUTO: 213 K/UL (ref 164–446)
PLATELET # BLD AUTO: 234 K/UL (ref 164–446)
PMV BLD AUTO: 10.7 FL (ref 9–12.9)
PMV BLD AUTO: 10.8 FL (ref 9–12.9)
POTASSIUM SERPL-SCNC: 4.3 MMOL/L (ref 3.6–5.5)
RBC # BLD AUTO: 3.78 M/UL (ref 4.2–5.4)
RBC # BLD AUTO: 3.96 M/UL (ref 4.2–5.4)
SODIUM SERPL-SCNC: 138 MMOL/L (ref 135–145)
WBC # BLD AUTO: 8.6 K/UL (ref 4.8–10.8)
WBC # BLD AUTO: 8.8 K/UL (ref 4.8–10.8)

## 2022-03-23 PROCEDURE — 85027 COMPLETE CBC AUTOMATED: CPT | Mod: XU

## 2022-03-23 PROCEDURE — A9270 NON-COVERED ITEM OR SERVICE: HCPCS

## 2022-03-23 PROCEDURE — RXMED WILLOW AMBULATORY MEDICATION CHARGE: Performed by: STUDENT IN AN ORGANIZED HEALTH CARE EDUCATION/TRAINING PROGRAM

## 2022-03-23 PROCEDURE — 85025 COMPLETE CBC W/AUTO DIFF WBC: CPT

## 2022-03-23 PROCEDURE — 99217 PR OBSERVATION CARE DISCHARGE: CPT | Mod: GC | Performed by: FAMILY MEDICINE

## 2022-03-23 PROCEDURE — 36415 COLL VENOUS BLD VENIPUNCTURE: CPT

## 2022-03-23 PROCEDURE — G0378 HOSPITAL OBSERVATION PER HR: HCPCS

## 2022-03-23 PROCEDURE — 700102 HCHG RX REV CODE 250 W/ 637 OVERRIDE(OP)

## 2022-03-23 PROCEDURE — 80048 BASIC METABOLIC PNL TOTAL CA: CPT

## 2022-03-23 RX ORDER — FERROUS SULFATE 325(65) MG
325 TABLET ORAL
Qty: 45 TABLET | Refills: 0 | Status: SHIPPED | OUTPATIENT
Start: 2022-03-23 | End: 2022-06-21

## 2022-03-23 RX ADMIN — SENNOSIDES AND DOCUSATE SODIUM 2 TABLET: 50; 8.6 TABLET ORAL at 04:27

## 2022-03-23 ASSESSMENT — COGNITIVE AND FUNCTIONAL STATUS - GENERAL
SUGGESTED CMS G CODE MODIFIER MOBILITY: CK
PERSONAL GROOMING: A LITTLE
MOVING TO AND FROM BED TO CHAIR: A LITTLE
CLIMB 3 TO 5 STEPS WITH RAILING: A LOT
MOBILITY SCORE: 15
STANDING UP FROM CHAIR USING ARMS: A LITTLE
DRESSING REGULAR LOWER BODY CLOTHING: A LOT
HELP NEEDED FOR BATHING: A LOT
DRESSING REGULAR UPPER BODY CLOTHING: A LOT
MOVING FROM LYING ON BACK TO SITTING ON SIDE OF FLAT BED: A LOT
SUGGESTED CMS G CODE MODIFIER DAILY ACTIVITY: CK
WALKING IN HOSPITAL ROOM: A LOT
TURNING FROM BACK TO SIDE WHILE IN FLAT BAD: A LITTLE
TOILETING: A LITTLE
DAILY ACTIVITIY SCORE: 16

## 2022-03-23 ASSESSMENT — PAIN DESCRIPTION - PAIN TYPE
TYPE: ACUTE PAIN
TYPE: ACUTE PAIN

## 2022-03-23 NOTE — PROGRESS NOTES
Received report from day shift RN. Greeted patient and tended to immediate needs and informed patient I would be back within the next hour. Reviewed pt's chart to include medications due, lab values for the day and upcoming due times, and orders. Helped pt get situated for the night; call bell from falling on floor; tv show set up; bed pan offered/cleaned up; pt denied any pain/issues with iron transfusion that was infusing; flushed and Saline locked patient after infusion completed; Bed in low locked position; with x2 side rails up, non-slip socks in place before ambulating; call bell and personal items within reach of patient. Reminded patient to use call bell before getting out of bed. Strip alarm on.

## 2022-03-23 NOTE — DISCHARGE PLANNING
Received Choice form at 1245  Agency/Facility Name: [facility]   [facility]   Referral sent per Choice form @ 1245     LSW notified

## 2022-03-23 NOTE — PROGRESS NOTES
Received report from ER RN Jackie. Patient A&Ox4. Patient denies pain at this time. Family at bedside. Call light within reach. Bed locked and lowest position. Bed alarm in place. Patient educated on using call light appropriately. Will continue to implement care during rest of shift.

## 2022-03-23 NOTE — DISCHARGE PLANNING
"HTH/SCP TCN chart review completed. Collaborated with LUIS ENRIQUE Romeo. The most current review of medical record, knowledge of pt's PLOF and social support, LACE+ score of 69. 6 clicks scores not documented.      Pt seen at bedside. Introduced TCN program. Pt is familiar with post-acute resources and endorses an admission at [facility] about 3 yrs ago. Discussed HTH/SCP plan benefits including Meds to Beds and [facility] transitional care services. Pt verbalizes understanding.    Pt reports living alone in a mobile home with 4-5 steps to enter. At baseline, she ambulates community distances with a FWW and can navigate her stairs indep. States \"I know I'd need some help getting up the stairs right now.\" Pt has a son and dtr-in-law in the area. RITA Valle visits the pt most evenings to assist with IADLs. \"I have lots of neighbors and friends that check on me too.\" Requested provider consults for: PT/OT from Dr. Sanchez via voalte- appreciate he will call the family and consider the orders. No choice obtained at this time. Pt agreeable to [facility] transitional care services- referral placed by email. TCN will continue to follow and collaborate with discharge planning team as additional post acute needs arise. Thank you.     Update 12:05- Dr. Sanchez is agreeable to  services. Met with pt bedside. HH choice completed, faxed to [facility] and given to LUIS ENRIQUE Romeo.   "

## 2022-03-23 NOTE — DISCHARGE PLANNING
ATTN: Case Management  RE: Referral for Home Health    As of 3/23/22, we have accepted the Home Health referral for the patient listed above.    A [facility] clinician will be out to see the patient within 48 hours. If you have any questions or concerns regarding the patient's transition to Home Health, please do not hesitate to contact us at [phone].      We look forward to collaborating with you,  [facility] Home Health Team

## 2022-03-23 NOTE — CARE PLAN
The patient is Stable - Low risk of patient condition declining or worsening    Shift Goals  Clinical Goals: monitor H&H; rest  Patient Goals: rest    Progress made toward(s) clinical / shift goals:  patient progressing towards goals during shift.       Problem: Knowledge Deficit - Standard  Goal: Patient and family/care givers will demonstrate understanding of plan of care, disease process/condition, diagnostic tests and medications  Outcome: Progressing   Patient updated on plan of care.   Problem: Fall Risk  Goal: Patient will remain free from falls  Outcome: Progressing   Patient remained free from falls during shift.

## 2022-03-23 NOTE — DISCHARGE SUMMARY
[facility] DISCHARGE SUMMARY     PATIENT ID:  Name:             Sherri Tejada   YOB: 1928  Age:                 89 y.o.  female   MRN:               [id number]  Address:         [address] [place] [id number]  Phone:            [phone] (home)    ADMISSION DATE:   3/22/2022    DISCHARGE DATE:   3/23/2022    DISCHARGE DIAGNOSES:   Problem Noted   Anemia 3/22/2022   Debility 3/23/2022     ATTENDING PHYSICIAN:   Dallas Perez MD    RESIDENT:   Sulma Sanchez MD   PGY-2     CONSULTANTS:    none    PROCEDURES:    none    IMAGING:   none    PHYSICAL EXAM:   General: No acute distress, afebrile, resting comfortably  HEENT: NC/AT. EOMI.   Cardiovascular: RRR without murmurs. Normal capillary refill   Respiratory: CTAB, no tachypnea or retractions  Abdomen: soft, nontender, nondistended, no masses  EXT:  AYERS, no edema  Skin: No erythema/lesions   Neuro: Non-focal    LABS:  Recent Labs     03/22/22  0828 03/22/22  1851 03/23/22  0047 03/23/22  0831   WBC 9.5 9.2 8.8 8.6   RBC 2.06* 3.85* 3.78* 3.96*   HEMOGLOBIN 4.8* 10.1* 10.0* 10.2*   HEMATOCRIT 17.5* 31.4* 31.2* 32.3*   MCV 85.0 81.6 82.5 81.6   MCH 23.3* 26.2* 26.5* 25.8*   RDW 64.9* 55.0* 56.7* 56.3*   PLATELETCT 294 223 213 234   MPV 12.1 10.9 10.8 10.7   NEUTSPOLYS 88.80* 83.90* 80.50*  --    LYMPHOCYTES 3.80* 5.90* 8.40*  --    MONOCYTES 6.60 6.80 8.70  --    EOSINOPHILS 0.30 0.90 1.00  --    BASOPHILS 0.20 0.40 0.30  --    RBCMORPHOLO Present  --   --   --      Recent Labs     03/22/22  0828 03/23/22  0047   SODIUM 139 138   POTASSIUM 4.1 4.3   CHLORIDE 105 107   CO2 22 18*   GLUCOSE 136* 109*   BUN 32* 29*     No results found for: CHOLSTRLTOT, LDL, HDL, TRIGLYCERIDE    No results found for: TROPONINI, CKMB  No results found for: TROPONINI, CKMB       Results for SHERRI TEJADA (MRN [id number]) as of 3/23/2022 13:18   Ref. Range 3/22/2022 08:28   Iron Latest Ref Range: 40 - 170 ug/dL 18 (L)   Total Iron Binding  Latest Ref Range: 250 - 450 ug/dL 354   % Saturation Latest Ref Range: 15 - 55 % 5 (L)   Unsat Iron Binding Latest Ref Range: 110 - 370 ug/dL 336   Fibrinogen Latest Ref Range: 215 - 460 mg/dL 434     Results for SHERRI TEJADA (MRN [id number]) as of 3/23/2022 13:18   Ref. Range 3/22/2022 08:28   Ferritin Latest Ref Range: 10.0 - 291.0 ng/mL 20.1       HOSPITAL COURSE:   Sherri Tejada is a 89 y.o. female with Pmhx of chronic ASHER presenting with generalized weakness for 3 weeks prior to arrival. She was discovered to have a hemoglobin of 4.8 in ED and she was admitted under observation overnight for multiple transfusions. Her iron panel was consistent with ASHER. She was transfused with 3 units of blood with improvement in her Hgb to 10.0. She also received an infusion of IV Iron dextran. She reports improvement in her symptoms of generalized weakness. Repeat CBC 8 hours following final transfusion was stable. There continue to be no signs or symptoms of active bleed. She is discharging to home with referral to home health and new prescription for ferrous sulfate (every other day dosing) for improved absorption and decreased side effects with hopes of improving compliance.     DISCHARGE CONDITION:    Stable    DISPOSITION:   Home with referal to home health    DISCHARGE MEDICATIONS:   Ferrous sulfate     ACTIVITY:   Normal Activity as Tolerated.    DIET:   Healthy    DISCHARGE INSTRUCTIONS AND FOLLOW UP:  Patient is medically stable for discharge and will be discharged to home with home health    Follow Up: with PCP within 1-2 weeks    Discharge Instructions:   Patient was instructed to return the ER in the event of worsening symptoms including but not limited to or any other major concerns. Patient understands that failure to do so may indicate worsening of her medical condition(s) and result in adverse clinical outcomes including fatality. We have counseled the patient on the importance of compliance and the  patient has agreed to proceed with all medical recommendations and follow up plan indicated above. The patient understands that the failure to do so may result in result in adverse clinical outcomes including fatality.       CC:   KARLENE Mcclellan.

## 2022-03-23 NOTE — CARE PLAN
The patient is Stable - Low risk of patient condition declining or worsening    Shift Goals  Clinical Goals: iron infusion; monitor H+H; rest  Patient Goals: rest comfortably    Progress made toward(s) clinical / shift goals:    Problem: Knowledge Deficit - Standard  Goal: Patient and family/care givers will demonstrate understanding of plan of care, disease process/condition, diagnostic tests and medications  Outcome: Progressing  Note: Updated patient on plan of care. Encouraged to ask questions and voice concerns. Answered all questions regarding care. Agrees with plan of care.        Patient is not progressing towards the following goals:

## 2022-03-23 NOTE — PROGRESS NOTES
[facility] MEDICINE PROGRESS NOTE     Attending: Dallas Perez MD    Resident: Gary Rodriguez MD    PATIENT: Sherri Balbuena; [id number]; 5/4/1928    ID: 89 y.o. female admitted for severe iron-deficiency anemia    SUBJECTIVE: No acute events overnight, pt remained hemodynamically stable overnight with BP increased after administration of blood products.     OBJECTIVE:  Temp:  [36.1 °C (97 °F)-37.7 °C (99.8 °F)] 36.7 °C (98.1 °F)  Pulse:  [65-88] 77  Resp:  [14-25] 16  BP: ()/(49-84) 137/72  SpO2:  [91 %-100 %] 93 %      Intake/Output Summary (Last 24 hours) at 3/23/2022 0631  Last data filed at 3/22/2022 1612  Gross per 24 hour   Intake 1500 ml   Output --   Net 1500 ml       PE:  General: No acute distress, resting comfortably in bed.  Skin:  warm and dry.  No rashes. Improved skin color.  HEENT: NC/AT. EOMI. MMM  Cardiovascular: RRR with a 2/5 systolic ejection murmur  Respiratory: Symmetric inspiratory effort. CTAB with no adventitious sounds  Abdomen: BS+, soft, NT/ND   EXT:  AYERS, 2+ radial pulses, no lower extremity edema bilaterally  Neuro: Non focal, alert and oriented    LABS:  Recent Labs     03/22/22  0828 03/22/22  1851 03/23/22  0047   WBC 9.5 9.2 8.8   RBC 2.06* 3.85* 3.78*   HEMOGLOBIN 4.8* 10.1* 10.0*   HEMATOCRIT 17.5* 31.4* 31.2*   MCV 85.0 81.6 82.5   MCH 23.3* 26.2* 26.5*   RDW 64.9* 55.0* 56.7*   PLATELETCT 294 223 213   MPV 12.1 10.9 10.8   NEUTSPOLYS 88.80* 83.90* 80.50*   LYMPHOCYTES 3.80* 5.90* 8.40*   MONOCYTES 6.60 6.80 8.70   EOSINOPHILS 0.30 0.90 1.00   BASOPHILS 0.20 0.40 0.30   RBCMORPHOLO Present  --   --      Recent Labs     03/22/22  0828 03/23/22  0047   SODIUM 139 138   POTASSIUM 4.1 4.3   CHLORIDE 105 107   CO2 22 18*   BUN 32* 29*   CREATININE 0.73 0.66   CALCIUM 9.5 9.4     Estimated GFR/CRCL = CrCl cannot be calculated (Unknown ideal weight.).  Recent Labs     03/22/22  0828 03/23/22  0047   GLUCOSE 136* 109*                 Recent Labs     03/22/22 0828    FIBRINOGEN 434       MICROBIOLOGY:  Results     ** No results found for the last 168 hours. **          IMAGING:  No orders to display       MEDS:  Current Facility-Administered Medications   Medication Last Admin   • senna-docusate (PERICOLACE or SENOKOT S) 8.6-50 MG per tablet 2 Tablet 2 Tablet at 03/23/22 0427    And   • polyethylene glycol/lytes (MIRALAX) PACKET 1 Packet      And   • magnesium hydroxide (MILK OF MAGNESIA) suspension 30 mL      And   • bisacodyl (DULCOLAX) suppository 10 mg     • acetaminophen (Tylenol) tablet 650 mg     • albuterol inhaler 2 Puff         PROBLEM LIST:  Problem Noted   Anemia 3/22/2022       ASSESSMENT/PLAN: 89 y.o. female admitted for severe iron-deficiency anemia     #Severe anemia  #Iron deficiency  #Weakness  Pt complains of weakness. On physical exam, her BP is soft and she appears somewhat pale. She has severe anemia with Hgb 4.8. It is normocytic with MCV 85, but MCH decreased at 23. Her Iron level is low at 18. Ferritin is 20 today but % saturation was 5. TIBC was normal. CMP with BUN 32 and Cr 0.7, concerning for dehydration. PMHx of hemoptysis and hematochezia in the past, but denies at this time.   DDx: Most likely iron deficiency anemia, but pt has hx of hematochezia and hemoptysis so we will check FOB.  - Pending FOB  - Pt had 3x transfusions yesterday  - Transfuse for Hgb<7  - Daily CBC to assess response  - IV Iron per pharmacy recs  - Encourage PO intake  - PO Fe supplements on discharge  - Due to hx of hematochezia and hemoptysis, holding pharmacological DVT PPx at this time. SCDs ordered.  - Recommend speech pathology cognitive evaluation due to some apparent memory concerns during this admission     #Asthma, mild  Pt reports mild asthma  - Albuterol PRN     #Dispo: discharge pending stable H&H     Core Measures:   Fluids: PRBC  Diet: Regular  Lines: PIV  Abx: none  DVT prophylaxis: SCDs  Code Status: DNR/DNI        Gary Rodriguez MD  PGY-1  [facility]

## 2022-03-23 NOTE — DISCHARGE INSTRUCTIONS
Discharge Instructions    Discharged to home by car with relative. Discharged via wheelchair, hospital escort: Yes.  Special equipment needed: Not Applicable    Be sure to schedule a follow-up appointment with your primary care doctor or any specialists as instructed.     Discharge Plan:   Diet Plan: Discussed  Activity Level: Discussed  Confirmed Follow up Appointment: Patient to Call and Schedule Appointment  Confirmed Symptoms Management: Discussed  Medication Reconciliation Updated: Yes  Influenza Vaccine Indication: Patient Refuses    I understand that a diet low in cholesterol, fat, and sodium is recommended for good health. Unless I have been given specific instructions below for another diet, I accept this instruction as my diet prescription.   Other diet: regular    Special Instructions: None    · Is patient discharged on Warfarin / Coumadin?   No     Depression / Suicide Risk    As you are discharged from this [facility] facility, it is important to learn how to keep safe from harming yourself.    Recognize the warning signs:  · Abrupt changes in personality, positive or negative- including increase in energy   · Giving away possessions  · Change in eating patterns- significant weight changes-  positive or negative  · Change in sleeping patterns- unable to sleep or sleeping all the time   · Unwillingness or inability to communicate  · Depression  · Unusual sadness, discouragement and loneliness  · Talk of wanting to die  · Neglect of personal appearance   · Rebelliousness- reckless behavior  · Withdrawal from people/activities they love  · Confusion- inability to concentrate     If you or a loved one observes any of these behaviors or has concerns about self-harm, here's what you can do:  · Talk about it- your feelings and reasons for harming yourself  · Remove any means that you might use to hurt yourself (examples: pills, rope, extension cords, firearm)  · Get professional help from the community  (Mental Health, Substance Abuse, psychological counseling)  · Do not be alone:Call your Safe Contact- someone whom you trust who will be there for you.  · Call your local CRISIS HOTLINE 656-8199 or 612-571-7147  · Call your local Children's Mobile Crisis Response Team [place] (237) 425-2621 or www.[employer]  · Call the toll free National Suicide Prevention Hotlines   · National Suicide Prevention Lifeline 092-162-RMGJ (0815)  · National Hope Line Network 800-SUICIDE (511-7429)      Iron Deficiency Anemia, Adult  Iron-deficiency anemia is when you have a low amount of red blood cells or hemoglobin. This happens because you have too little iron in your body. Hemoglobin carries oxygen to parts of the body. Anemia can cause your body to not get enough oxygen. It may or may not cause symptoms.  Follow these instructions at home:  Medicines  · Take over-the-counter and prescription medicines only as told by your doctor. This includes iron pills (supplements) and vitamins.  · If you cannot handle taking iron pills by mouth, ask your doctor about getting iron through:  ? A vein (intravenously).  ? A shot (injection) into a muscle.  · Take iron pills when your stomach is empty. If you cannot handle this, take them with food.  · Do not drink milk or take antacids at the same time as your iron pills.  · To prevent trouble pooping (constipation), eat fiber or take medicine (stool softener) as told by your doctor.  Eating and drinking    · Talk with your doctor before changing the foods you eat. He or she may tell you to eat foods that have a lot of iron, such as:  ? Liver.  ? Lowfat (lean) beef.  ? Breads and cereals that have iron added to them (fortified breads and cereals).  ? Eggs.  ? Dried fruit.  ? Dark green, leafy vegetables.  · Drink enough fluid to keep your pee (urine) clear or pale yellow.  · Eat fresh fruits and vegetables that are high in vitamin C. They help your body to use iron. Foods with a lot of  vitamin C include:  ? Oranges.  ? Peppers.  ? Tomatoes.  ? Mangoes.  General instructions  · Return to your normal activities as told by your doctor. Ask your doctor what activities are safe for you.  · Keep yourself clean, and keep things clean around you (your surroundings). Anemia can make you get sick more easily.  · Keep all follow-up visits as told by your doctor. This is important.  Contact a doctor if:  · You feel sick to your stomach (nauseous).  · You throw up (vomit).  · You feel weak.  · You are sweating for no clear reason.  · You have trouble pooping, such as:  ? Pooping (having a bowel movement) less than 3 times a week.  ? Straining to poop.  ? Having poop that is hard, dry, or larger than normal.  ? Feeling full or bloated.  ? Pain in the lower belly.  ? Not feeling better after pooping.  Get help right away if:  · You pass out (faint). If this happens, do not drive yourself to the hospital. Call your local emergency services (911 in the [place]  · You have chest pain.  · You have shortness of breath that:  ? Is very bad.  ? Gets worse with physical activity.  · You have a fast heartbeat.  · You get light-headed when getting up from sitting or lying down.  This information is not intended to replace advice given to you by your health care provider. Make sure you discuss any questions you have with your health care provider.  Document Released: 01/20/2012 Document Revised: 11/30/2018 Document Reviewed: 09/06/2017  Elsevier Patient Education © 2020 [employer] Inc[employer]    Iron-Rich Diet    Iron is a mineral that helps your body to produce hemoglobin. Hemoglobin is a protein in red blood cells that carries oxygen to your body's tissues. Eating too little iron may cause you to feel weak and tired, and it can increase your risk of infection. Iron is naturally found in many foods, and many foods have iron added to them (iron-fortified foods).  You may need to follow an iron-rich diet if you do not have enough  iron in your body due to certain medical conditions. The amount of iron that you need each day depends on your age, your sex, and any medical conditions you have. Follow instructions from your health care provider or a diet and nutrition specialist (dietitian) about how much iron you should eat each day.  What are tips for following this plan?  Reading food labels  · Check food labels to see how many milligrams (mg) of iron are in each serving.  Cooking  · Cook foods in pots and pans that are made from iron.  · Take these steps to make it easier for your body to absorb iron from certain foods:  ? Soak beans overnight before cooking.  ? Soak whole grains overnight and drain them before using.  ? Ferment flours before baking, such as by using yeast in bread dough.  Meal planning  · When you eat foods that contain iron, you should eat them with foods that are high in vitamin C. These include oranges, peppers, tomatoes, potatoes, and scottie. Vitamin C helps your body to absorb iron.  General information  · Take iron supplements only as told by your health care provider. An overdose of iron can be life-threatening. If you were prescribed iron supplements, take them with orange juice or a vitamin C supplement.  · When you eat iron-fortified foods or take an iron supplement, you should also eat foods that naturally contain iron, such as meat, poultry, and fish. Eating naturally iron-rich foods helps your body to absorb the iron that is added to other foods or contained in a supplement.  · Certain foods and drinks prevent your body from absorbing iron properly. Avoid eating these foods in the same meal as iron-rich foods or with iron supplements. These foods include:  ? Coffee, black tea, and red wine.  ? Milk, dairy products, and foods that are high in calcium.  ? Beans and soybeans.  ? Whole grains.  What foods should I eat?  Fruits  Prunes. Raisins.  Eat fruits high in vitamin C, such as oranges, grapefruits, and  strawberries, alongside iron-rich foods.  Vegetables  Spinach (cooked). Green peas. Broccoli. Fermented vegetables.  Eat vegetables high in vitamin C, such as leafy greens, potatoes, bell peppers, and tomatoes, alongside iron-rich foods.  Grains  Iron-fortified breakfast cereal. Iron-fortified whole-wheat bread. Enriched rice. Sprouted grains.  Meats and other proteins  Beef liver. Oysters. Beef. Shrimp. Turkey. Chicken. Tuna. Sardines. Chickpeas. Nuts. Tofu. Pumpkin seeds.  Beverages  Tomato juice. Fresh orange juice. Prune juice. Hibiscus tea. Fortified instant breakfast shakes.  Sweets and desserts  Blackstrap molasses.  Seasonings and condiments  Tahini. Fermented soy sauce.  Other foods  Wheat germ.  The items listed above may not be a complete list of recommended foods and beverages. Contact a dietitian for more information.  What foods should I avoid?  Grains  Whole grains. Bran cereal. Bran flour. Oats.  Meats and other proteins  Soybeans. Products made from soy protein. Black beans. Lentils. Mung beans. Split peas.  Dairy  Milk. Cream. Cheese. Yogurt. Cottage cheese.  Beverages  Coffee. Black tea. Red wine.  Sweets and desserts  Cocoa. Chocolate. Ice cream.  Other foods  Basil. Oregano. Large amounts of parsley.  The items listed above may not be a complete list of foods and beverages to avoid. Contact a dietitian for more information.  Summary  · Iron is a mineral that helps your body to produce hemoglobin. Hemoglobin is a protein in red blood cells that carries oxygen to your body's tissues.  · Iron is naturally found in many foods, and many foods have iron added to them (iron-fortified foods).  · When you eat foods that contain iron, you should eat them with foods that are high in vitamin C. Vitamin C helps your body to absorb iron.  · Certain foods and drinks prevent your body from absorbing iron properly, such as whole grains and dairy products. You should avoid eating these foods in the same meal as  iron-rich foods or with iron supplements.  This information is not intended to replace advice given to you by your health care provider. Make sure you discuss any questions you have with your health care provider.  Document Released: 08/01/2006 Document Revised: 11/30/2018 Document Reviewed: 11/13/2018  Elsevier Patient Education © 2020 Elsevier Inc.

## 2022-03-23 NOTE — ED NOTES
Report given to Paresh FERRARI from [facility] and aware of repeat CBC pending.

## 2022-03-23 NOTE — FACE TO FACE
Face to Face Supporting Documentation - Home Health    The encounter with this patient was in whole or in part the primary reason for home health admission.    Date of encounter:   Patient:                    MRN:                       YOB: 2022  Sherri Balbuena  [id number]  5/4/1928     Home health to see patient for:  Skilled Nursing care for assessment, interventions & education    Skilled need for:  Exacerbation of Chronic Disease State anemia, debility    Skilled nursing interventions to include:  Comment: Assessment for physical therapy needs, medication management    Homebound status evidenced by:  Need the aid of supportive devices such as crutches, canes, wheelchairs or walkers. Leaving home requires a considerable and taxing effort. There is a normal inability to leave the home.    Community Physician to provide follow up care: FLAKITO Mcclellan     Optional Interventions? No      I certify the face to face encounter for this home health care referral meets the CMS requirements and the encounter/clinical assessment with the patient was, in whole, or in part, for the medical condition(s) listed above, which is the primary reason for home health care. Based on my clinical findings: the service(s) are medically necessary, support the need for home health care, and the homebound criteria are met.  I certify that this patient has had a face to face encounter by myself.  Sulma Fischer M.D. - NPI: [id number]

## 2022-03-23 NOTE — PROGRESS NOTES
Assumed care of pt  at 0700. Report received by NOC RN. Pt is A&O x 4. Patient declines pain at this time. Bed in lowest locked position, call light within reach, hourly rounding in place. Labs reviewed, orders reviewed, communication board updated.

## 2022-03-24 ENCOUNTER — TELEPHONE (OUTPATIENT)
Dept: MEDICAL GROUP | Facility: PHYSICIAN GROUP | Age: 87
End: 2022-03-24
Payer: MEDICARE

## 2022-03-24 ENCOUNTER — TELEPHONE (OUTPATIENT)
Dept: HEALTH INFORMATION MANAGEMENT | Facility: OTHER | Age: 87
End: 2022-03-24

## 2022-03-24 NOTE — TELEPHONE ENCOUNTER
Outcome: pt requested a callback     Please transfer to Patient Outreach Team at [phone] when patient returns call.      Attempt # 1

## 2022-11-10 ENCOUNTER — TELEPHONE (OUTPATIENT)
Dept: HEALTH INFORMATION MANAGEMENT | Facility: OTHER | Age: 87
End: 2022-11-10

## 2023-05-03 ENCOUNTER — TELEPHONE (OUTPATIENT)
Dept: HEALTH INFORMATION MANAGEMENT | Facility: OTHER | Age: 88
End: 2023-05-03
Payer: MEDICARE

## 2023-07-24 ENCOUNTER — DOCUMENTATION (OUTPATIENT)
Dept: HEALTH INFORMATION MANAGEMENT | Facility: OTHER | Age: 88
End: 2023-07-24
Payer: MEDICARE

## 2024-12-30 NOTE — TELEPHONE ENCOUNTER
Daughter called and canceled apt. HH is coming out Tuesday. Offered a later apt in April but daughter declined and said she would call to schedule one.   
Phone Number Called: [phone] (home)       Call outcome: Spoke to patient regarding message below.    Message: Pt schedule apt for 03/29/22 @1400. Pt needs to confirm if she can get a ride at that time. Pt will call to confirm apt.     
VOICEMAIL  1. Caller Name: Sherri Balbuena                        Call Back Number: [phone] (home)       2. Message: Make apt to see PCP     3. Patient approves office to leave a detailed voicemail/MyChart message: N\A    
9
DISPLAY PLAN FREE TEXT

## (undated) DEVICE — CANISTER SUCTION RIGID RED 1500CC (40EA/CA)

## (undated) DEVICE — TUBING CLEARLINK DUO-VENT - C-FLO (48EA/CA)

## (undated) DEVICE — MANIFOLD NEPTUNE 1 PORT (20/PK)

## (undated) DEVICE — SENSOR SPO2 NEO LNCS ADHESIVE (20/BX) SEE USER NOTES

## (undated) DEVICE — ELECTRODE 850 FOAM ADHESIVE - HYDROGEL RADIOTRNSPRNT (50/PK)

## (undated) DEVICE — NEPTUNE 4 PORT MANIFOLD - (20/PK)

## (undated) DEVICE — SET EXTENSION WITH 2 PORTS (48EA/CA) ***PART #2C8610 IS A SUBSTITUTE*****

## (undated) DEVICE — LACTATED RINGERS INJ 1000 ML - (14EA/CA 60CA/PF)

## (undated) DEVICE — GOWN WARMING STANDARD FLEX - (30/CA)

## (undated) DEVICE — TUBE CONNECTING SUCTION - CLEAR PLASTIC STERILE 72 IN (50EA/CA)

## (undated) DEVICE — CATHETER IV 20 GA X 1-1/4 ---SURG.& SDS ONLY--- (50EA/BX)

## (undated) DEVICE — KIT CUSTOM PROCEDURE SINGLE FOR ENDO  (15/CA)

## (undated) DEVICE — TUBING O2 7FT TIP SMTH BORE - (50/CA)

## (undated) DEVICE — CANNULA O2 COMFORT SOFT EAR ADULT 7 FT TUBING (50/CA)

## (undated) DEVICE — FILM CASSETTE ENDO

## (undated) DEVICE — MASK WITH FACE SHIELD (25/BX 4BX/CA)

## (undated) DEVICE — TUBE NG SALEM SUMP 14FR (50EA/BX)

## (undated) DEVICE — CANISTER SUCTION 3000ML MECHANICAL FILTER AUTO SHUTOFF MEDI-VAC NONSTERILE LF DISP  (40EA/CA)

## (undated) DEVICE — CONTAINER, SPECIMEN, STERILE